# Patient Record
Sex: FEMALE | Race: BLACK OR AFRICAN AMERICAN | URBAN - METROPOLITAN AREA
[De-identification: names, ages, dates, MRNs, and addresses within clinical notes are randomized per-mention and may not be internally consistent; named-entity substitution may affect disease eponyms.]

---

## 2017-03-15 ENCOUNTER — OFFICE VISIT (OUTPATIENT)
Dept: FAMILY MEDICINE | Facility: CLINIC | Age: 32
End: 2017-03-15
Payer: COMMERCIAL

## 2017-03-15 VITALS
HEART RATE: 84 BPM | OXYGEN SATURATION: 100 % | TEMPERATURE: 98.5 F | RESPIRATION RATE: 12 BRPM | BODY MASS INDEX: 34.23 KG/M2 | SYSTOLIC BLOOD PRESSURE: 120 MMHG | DIASTOLIC BLOOD PRESSURE: 56 MMHG | HEIGHT: 63 IN | WEIGHT: 193.2 LBS

## 2017-03-15 DIAGNOSIS — N76.0 BACTERIAL VAGINOSIS: Primary | ICD-10-CM

## 2017-03-15 DIAGNOSIS — N89.8 VAGINAL ODOR: ICD-10-CM

## 2017-03-15 DIAGNOSIS — B96.89 BACTERIAL VAGINOSIS: Primary | ICD-10-CM

## 2017-03-15 DIAGNOSIS — N89.8 VAGINAL DISCHARGE: ICD-10-CM

## 2017-03-15 LAB
MICRO REPORT STATUS: ABNORMAL
SPECIMEN SOURCE: ABNORMAL
WET PREP SPEC: ABNORMAL

## 2017-03-15 PROCEDURE — 87210 SMEAR WET MOUNT SALINE/INK: CPT | Performed by: PHYSICIAN ASSISTANT

## 2017-03-15 PROCEDURE — 99213 OFFICE O/P EST LOW 20 MIN: CPT | Performed by: PHYSICIAN ASSISTANT

## 2017-03-15 RX ORDER — METRONIDAZOLE 500 MG/1
500 TABLET ORAL 2 TIMES DAILY
Qty: 14 TABLET | Refills: 0 | Status: SHIPPED | OUTPATIENT
Start: 2017-03-15 | End: 2017-08-14

## 2017-03-15 NOTE — PATIENT INSTRUCTIONS
Take flagyl twice a day for 7 days   Follow up with us if no improvement over the next 3-5 days  Return urgently if any change in symptoms.

## 2017-03-15 NOTE — MR AVS SNAPSHOT
"              After Visit Summary   3/15/2017    Gisselle Kilpatrick    MRN: [id number]           Patient Information     Date Of Birth          1985        Visit Information        Provider Department      3/15/2017 3:40 PM Sade Crockett PA-C [facility]        Today's Diagnoses     Vaginal discharge    -  1    Vaginal odor        Bacterial vaginosis          Care Instructions    Take flagyl twice a day for 7 days   Follow up with us if no improvement over the next 3-5 days  Return urgently if any change in symptoms.          Follow-ups after your visit        Who to contact     If you have questions or need follow up information about today's clinic visit or your schedule please contact [facility] directly at [phone].  Normal or non-critical lab and imaging results will be communicated to you by MyChart, letter or phone within 4 business days after the clinic has received the results. If you do not hear from us within 7 days, please contact the clinic through [employer] or phone. If you have a critical or abnormal lab result, we will notify you by phone as soon as possible.  Submit refill requests through [employer] or call your pharmacy and they will forward the refill request to us. Please allow 3 business days for your refill to be completed.          Additional Information About Your Visit        MyChart Information     [employer] lets you send messages to your doctor, view your test results, renew your prescriptions, schedule appointments and more. To sign up, go to [email or web address] . Click on \"Log in\" on the left side of the screen, which will take you to the Welcome page. Then click on \"Sign up Now\" on the right side of the page.     You will be asked to enter the access code listed below, as well as some personal information. Please follow the directions to create your username and password.     Your access code is: [email or web address]  Expires: 6/13/2017  4:17 PM     Your " "access code will  in 90 days. If you need help or a new code, please call your [facility] clinic or [phone].        Care EveryWhere ID     This is your Care EveryWhere ID. This could be used by other organizations to access your [facility] medical records  [id number]        Your Vitals Were     Pulse Temperature Respirations Height Pulse Oximetry BMI (Body Mass Index)    84 98.5  F (36.9  C) (Oral) 12 1.607 m (5' 3.25\") 100% 33.95 kg/m2       Blood Pressure from Last 3 Encounters:   03/15/17 120/56    Weight from Last 3 Encounters:   03/15/17 87.6 kg (193 lb 3.2 oz)              We Performed the Following     Wet prep          Today's Medication Changes          These changes are accurate as of: 3/15/17  4:17 PM.  If you have any questions, ask your nurse or doctor.               Start taking these medicines.        Dose/Directions    metroNIDAZOLE 500 MG tablet   Commonly known as:  FLAGYL   Used for:  Bacterial vaginosis   Started by:  Sade Crockett PA-C        Dose:  500 mg   Take 1 tablet (500 mg) by mouth 2 times daily   Quantity:  14 tablet   Refills:  0            Where to get your medicines      These medications were sent to [facility] [address] [place] [address] [address] [facility] AT [facility]  [address] [facility] [address]     Phone:  [phone]     metroNIDAZOLE 500 MG tablet                Primary Care Provider    None Specified       No primary provider on file.        Thank you!     Thank you for choosing [facility]  for your care. Our goal is always to provide you with excellent care. Hearing back from our patients is one way we can continue to improve our services. Please take a few minutes to complete the written survey that you may receive in the mail after your visit with us. Thank you!             Your Updated Medication List - Protect others around you: Learn how to safely use, store and throw away your medicines at " www.disposemymeds.org.          This list is accurate as of: 3/15/17  4:17 PM.  Always use your most recent med list.                   Brand Name Dispense Instructions for use    metroNIDAZOLE 500 MG tablet    FLAGYL    14 tablet    Take 1 tablet (500 mg) by mouth 2 times daily

## 2017-03-15 NOTE — Clinical Note
Pap smear done on this date: 11/2016 (approximately), by this group: [facility], results were Normal.

## 2017-03-15 NOTE — NURSING NOTE
"Chief Complaint   Patient presents with     Vaginal Problem       Initial /56  Pulse 84  Temp 98.5  F (36.9  C) (Oral)  Resp 12  Ht 1.607 m (5' 3.25\")  Wt 87.6 kg (193 lb 3.2 oz)  SpO2 100%  BMI 33.95 kg/m2 Estimated body mass index is 33.95 kg/(m^2) as calculated from the following:    Height as of this encounter: 1.607 m (5' 3.25\").    Weight as of this encounter: 87.6 kg (193 lb 3.2 oz).  Medication Reconciliation: gloria Navarro        "

## 2017-03-15 NOTE — PROGRESS NOTES
SUBJECTIVE:                                                    Gisselle Kilpatrick is a 31 year old female who presents to clinic today for the following health issues:      Vaginal Symptoms     Onset: about a week ago    Description:  Vaginal Discharge: white   Itching (Pruritis): no   Burning sensation:  no   Odor: YES    Accompanying Signs & Symptoms:  Pain with Urination: no   Abdominal Pain: no   Fever: no    History:   Sexually active: no   New Partner: no   Possibility of Pregnancy:  No    Precipitating factors:   Recent Antibiotic Use: no     Alleviating factors:  nothing   Therapies Tried and outcome: nothing    Please abstract the following data from this visit with this patient into the appropriate field in Epic:    Pap smear done on this date: 11/2016 (approximately), by this group: [facility], results were Normal.     Reports that she develops bacterial vaginosis approximately every 6 months.  Reports has not been sexually active since September and tested for sexually transmitted disease at that time and declines testing today.   No abdominal pain.  No flank pain.  No urinary tract infection symptoms.   Creamy white vaginal discharge for approximately one week.     Problem list and histories reviewed & adjusted, as indicated.  Additional history: as documented    There is no problem list on file for this patient.    History reviewed. No pertinent past surgical history.    Social History   Substance Use Topics     Smoking status: Never Smoker     Smokeless tobacco: Not on file     Alcohol use No     Family History   Problem Relation Age of Onset     DIABETES Mother      Hypertension Mother          Current Outpatient Prescriptions   Medication Sig Dispense Refill     None           Reviewed and updated as needed this visit by clinical staff  Tobacco  Allergies  Meds  Med Hx  Surg Hx  Fam Hx  Soc Hx      Reviewed and updated as needed this visit by Provider         ROS:  Constitutional, HEENT,  "cardiovascular, pulmonary, gi and gu systems are negative, except as otherwise noted.    OBJECTIVE:                                                    /56  Pulse 84  Temp 98.5  F (36.9  C) (Oral)  Resp 12  Ht 1.607 m (5' 3.25\")  Wt 87.6 kg (193 lb 3.2 oz)  SpO2 100%  BMI 33.95 kg/m2  Body mass index is 33.95 kg/(m^2).  GENERAL: healthy, alert and no distress  NECK: no adenopathy, no asymmetry, masses, or scars and thyroid normal to palpation  RESP: lungs clear to auscultation - no rales, rhonchi or wheezes  CV: regular rate and rhythm, normal S1 S2, no S3 or S4, no murmur, click or rub, no peripheral edema and peripheral pulses strong  ABDOMEN: soft, nontender, no hepatosplenomegaly, no masses and bowel sounds normal   (female): normal female external genitalia, normal urethral meatus , vaginal mucosa pink, moist, well rugated, vaginal discharge - moderate, white and milky and normal cervix, adnexae, and uterus without masses.  MS: no gross musculoskeletal defects noted, no edema    Diagnostic Test Results:  Results for orders placed or performed in visit on 03/15/17 (from the past 24 hour(s))   Wet prep   Result Value Ref Range    Specimen Description Cervical     Wet Prep (A)      No Trichomonas seen  Clue cells seen  No yeast seen      Micro Report Status FINAL 03/15/2017         ASSESSMENT/PLAN:                                                            1. Bacterial vaginosis  Patient prefers to treat with oral flagyl. Aware no alcohol with medication.    - metroNIDAZOLE (FLAGYL) 500 MG tablet; Take 1 tablet (500 mg) by mouth 2 times daily  Dispense: 14 tablet; Refill: 0    2. Vaginal discharge    - Wet prep    3. Vaginal odor    - Wet prep    Patient Instructions   Take flagyl twice a day for 7 days   Follow up with us if no improvement over the next 3-5 days  Return urgently if any change in symptoms.         Sade Crockett PA-C  [facility]    "

## 2017-08-14 ENCOUNTER — OFFICE VISIT (OUTPATIENT)
Dept: FAMILY MEDICINE | Facility: CLINIC | Age: 32
End: 2017-08-14
Payer: COMMERCIAL

## 2017-08-14 VITALS
WEIGHT: 190.3 LBS | RESPIRATION RATE: 12 BRPM | SYSTOLIC BLOOD PRESSURE: 124 MMHG | DIASTOLIC BLOOD PRESSURE: 72 MMHG | HEIGHT: 63 IN | BODY MASS INDEX: 33.72 KG/M2 | TEMPERATURE: 98.3 F | HEART RATE: 68 BPM | OXYGEN SATURATION: 98 %

## 2017-08-14 DIAGNOSIS — N63.0 LUMP OR MASS IN BREAST: Primary | ICD-10-CM

## 2017-08-14 PROCEDURE — 99213 OFFICE O/P EST LOW 20 MIN: CPT | Performed by: NURSE PRACTITIONER

## 2017-08-14 NOTE — NURSING NOTE
"Chief Complaint   Patient presents with     Mass       Initial /72 (BP Location: Right arm, Patient Position: Chair, Cuff Size: Adult Large)  Pulse 68  Temp 98.3  F (36.8  C) (Oral)  Resp 12  Ht 1.607 m (5' 3.25\")  Wt 86.3 kg (190 lb 4.8 oz)  SpO2 98%  BMI 33.44 kg/m2 Estimated body mass index is 33.44 kg/(m^2) as calculated from the following:    Height as of this encounter: 1.607 m (5' 3.25\").    Weight as of this encounter: 86.3 kg (190 lb 4.8 oz).  Medication Reconciliation: gloria Navarro        "

## 2017-08-14 NOTE — MR AVS SNAPSHOT
"              After Visit Summary   8/14/2017    Gisselle Kilpatrick    MRN: [id number]           Patient Information     Date Of Birth          1985        Visit Information        Provider Department      8/14/2017 1:00 PM Jannie Issa NP [facility]        Today's Diagnoses     Need for prophylactic vaccination with tetanus-diphtheria (TD)    -  1    Lump or mass in breast          Care Instructions    Ultrasound: [phone]          Follow-ups after your visit        Future tests that were ordered for you today     Open Future Orders        Priority Expected Expires Ordered    *MA Screening Digital Bilateral Routine  8/14/2018 8/14/2017    US Breast Bilateral Complete 4 Quadrants Routine  8/14/2018 8/14/2017            Who to contact     If you have questions or need follow up information about today's clinic visit or your schedule please contact [facility] directly at [phone].  Normal or non-critical lab and imaging results will be communicated to you by MyChart, letter or phone within 4 business days after the clinic has received the results. If you do not hear from us within 7 days, please contact the clinic through MyChart or phone. If you have a critical or abnormal lab result, we will notify you by phone as soon as possible.  Submit refill requests through [employer] or call your pharmacy and they will forward the refill request to us. Please allow 3 business days for your refill to be completed.          Additional Information About Your Visit        MyChart Information     [employer] lets you send messages to your doctor, view your test results, renew your prescriptions, schedule appointments and more. To sign up, go to [email or web address] . Click on \"Log in\" on the left side of the screen, which will take you to the Welcome page. Then click on \"Sign up Now\" on the right side of the page.     You will be asked to enter the access code listed below, as well as some " "personal information. Please follow the directions to create your username and password.     Your access code is: [email or web address]  Expires: 2017  1:16 PM     Your access code will  in 90 days. If you need help or a new code, please call your [facility] [place] clinic or [phone].        Care EveryWhere ID     This is your Care EveryWhere ID. This could be used by other organizations to access your [facility] medical records  [id number]        Your Vitals Were     Pulse Temperature Respirations Height Pulse Oximetry BMI (Body Mass Index)    68 98.3  F (36.8  C) (Oral) 12 1.607 m (5' 3.25\") 98% 33.44 kg/m2       Blood Pressure from Last 3 Encounters:   17 124/72   03/15/17 120/56    Weight from Last 3 Encounters:   17 86.3 kg (190 lb 4.8 oz)   03/15/17 87.6 kg (193 lb 3.2 oz)               Primary Care Provider    None Specified       No primary provider on file.        Equal Access to Services     [facility]: Hadii francesca godoy hadasho Soomaali, waaxda luqadaha, qaybta kaalmada adeegyamyels, chadd spring . So [facility] [phone].    ATENCIÓN: Si habla español, tiene a young disposición servicios gratuitos de asistencia lingüística. Llame al [phone].    We comply with applicable federal civil rights laws and [place] laws. We do not discriminate on the basis of race, color, national origin, age, disability sex, sexual orientation or gender identity.            Thank you!     Thank you for choosing [facility]  for your care. Our goal is always to provide you with excellent care. Hearing back from our patients is one way we can continue to improve our services. Please take a few minutes to complete the written survey that you may receive in the mail after your visit with us. Thank you!             Your Updated Medication List - Protect others around you: Learn how to safely use, store and throw away your medicines at www.disposemymeds.org.      Notice  As of 2017 "  1:16 PM    You have not been prescribed any medications.

## 2017-08-14 NOTE — PROGRESS NOTES
"  SUBJECTIVE:                                                    Gisselle Kilpatrick is a 32 year old female who presents to clinic today for the following health issues:      Had a mass removed in LT breast last year at [facility] around 8/30/17, and has another one in the same spot that you noticed a couple days ago. Is due for her mammogram.  She has found the mass that was removed last year. It started to change in size and bother her so she had it removed.         Problem list and histories reviewed & adjusted, as indicated.  Additional history: as documented    There is no problem list on file for this patient.    Past Surgical History:   Procedure Laterality Date     LUMPECTOMY BREAST Left 08/2016       Social History   Substance Use Topics     Smoking status: Never Smoker     Smokeless tobacco: Never Used     Alcohol use No     Family History   Problem Relation Age of Onset     DIABETES Mother      Hypertension Mother      KIDNEY DISEASE Paternal Grandmother      dialysis     Lung Cancer Maternal Uncle          No current outpatient prescriptions on file.     No Known Allergies      Reviewed and updated as needed this visit by clinical staffTobacco  Allergies  Meds  Problems  Med Hx  Surg Hx  Fam Hx  Soc Hx        Reviewed and updated as needed this visit by Provider  Allergies  Meds  Problems  Surg Hx  Fam Hx         ROS:  Constitutional, breast systems are negative, except as otherwise noted.      OBJECTIVE:   /72 (BP Location: Right arm, Patient Position: Chair, Cuff Size: Adult Large)  Pulse 68  Temp 98.3  F (36.8  C) (Oral)  Resp 12  Ht 1.607 m (5' 3.25\")  Wt 86.3 kg (190 lb 4.8 oz)  SpO2 98%  BMI 33.44 kg/m2  Body mass index is 33.44 kg/(m^2).  GENERAL: healthy, alert and no distress  BREAST: normal without masses, tenderness or nipple discharge and no palpable axillary masses or adenopathy  MS: no gross musculoskeletal defects noted, no edema    Diagnostic Test " Results:  none     ASSESSMENT/PLAN:       1. Lump or mass in breast  Lump in same area as one removed last year. Last year it was benign. Will order ultrasound and mammogram with diagnostic testing per radiology.   - *MA Screening Digital Bilateral; Future  - US Breast Bilateral Complete 4 Quadrants; Future    FUTURE APPOINTMENTS:       - Follow-up for annual visit or as needed    CAMRYN Holden, NP-C  [facility]

## 2017-08-22 ENCOUNTER — RADIANT APPOINTMENT (OUTPATIENT)
Dept: MAMMOGRAPHY | Facility: CLINIC | Age: 32
End: 2017-08-22
Payer: COMMERCIAL

## 2017-08-22 ENCOUNTER — RADIANT APPOINTMENT (OUTPATIENT)
Dept: ULTRASOUND IMAGING | Facility: CLINIC | Age: 32
End: 2017-08-22
Payer: COMMERCIAL

## 2017-08-22 DIAGNOSIS — N63.0 LUMP OR MASS IN BREAST: ICD-10-CM

## 2017-08-22 PROCEDURE — 76642 ULTRASOUND BREAST LIMITED: CPT | Mod: LT | Performed by: RADIOLOGY

## 2017-08-22 PROCEDURE — G0204 DX MAMMO INCL CAD BI: HCPCS | Performed by: RADIOLOGY

## 2017-08-28 ENCOUNTER — RADIANT APPOINTMENT (OUTPATIENT)
Dept: ULTRASOUND IMAGING | Facility: CLINIC | Age: 32
End: 2017-08-28
Payer: COMMERCIAL

## 2017-08-28 DIAGNOSIS — N63.0 LUMP OR MASS IN BREAST: ICD-10-CM

## 2017-08-28 PROCEDURE — 88305 TISSUE EXAM BY PATHOLOGIST: CPT | Performed by: NURSE PRACTITIONER

## 2017-08-28 PROCEDURE — 19083 BX BREAST 1ST LESION US IMAG: CPT | Mod: LT | Performed by: RADIOLOGY

## 2017-08-30 ENCOUNTER — TELEPHONE (OUTPATIENT)
Dept: GENERAL RADIOLOGY | Facility: CLINIC | Age: 32
End: 2017-08-30

## 2017-08-30 LAB — COPATH REPORT: NORMAL

## 2017-08-30 NOTE — TELEPHONE ENCOUNTER
Spoke with patient regarding left breast biopsy results, which indicate a benign fibroadenoma. Notified pt that the radiologist recommendation is surgical consultation. Pt verbalized understanding of these results and all questions answered to her satisfaction. Pt is scheduled with Dr. Tatum Huynh on 9/14/17.

## 2017-09-11 ENCOUNTER — TELEPHONE (OUTPATIENT)
Dept: SURGERY | Facility: CLINIC | Age: 32
End: 2017-09-11

## 2017-09-11 NOTE — TELEPHONE ENCOUNTER
PREVISIT INFORMATION                                                    Gisselle Kilpatrick is scheduled for future visit with Dr. Huynh on 9/14/17 at the [facility] specialty clinics.    Reason for visit: Fibroadenoma  Referring provider:Radiology  Have images been done? Yes    Location: [facility]   Date: 8/22/17 8/28/17  Previous pathology reports? yes  Have previous office records been requested? no  Has the patient seen Dr. Huynh in the past? (for old records): no     REVIEW                                                      New patient packet mailed to patient: to be given at check in   Medication reconciliation complete: No    PLAN/FOLLOW-UP NEEDED                                                      Previsit review complete.  Patient will see provider at future scheduled appointment.     Patient Reminders Given:    Informed patient to bring an updated list of allergies, medications, pharmacy details and insurance information. Directed patient to come to the 2nd floor, check-in #4 for their appointment. Informed patient to call back if appointment needs to be cancelled or rescheduled at [phone].    Reminded patient to bring any outside records regarding this appointment or have them faxed to clinic at [phone].

## 2017-09-14 ENCOUNTER — OFFICE VISIT (OUTPATIENT)
Dept: SURGERY | Facility: CLINIC | Age: 32
End: 2017-09-14
Payer: COMMERCIAL

## 2017-09-14 VITALS
HEART RATE: 63 BPM | SYSTOLIC BLOOD PRESSURE: 115 MMHG | DIASTOLIC BLOOD PRESSURE: 72 MMHG | HEIGHT: 63 IN | WEIGHT: 188.1 LBS | BODY MASS INDEX: 33.33 KG/M2

## 2017-09-14 DIAGNOSIS — N63.20 BREAST MASS, LEFT: Primary | ICD-10-CM

## 2017-09-14 PROCEDURE — 99243 OFF/OP CNSLTJ NEW/EST LOW 30: CPT | Performed by: SURGERY

## 2017-09-14 NOTE — PROGRESS NOTES
CHIEF COMPLAINT:  Chief Complaint   Patient presents with     Consult     Fibroadenoma       HISTORY OF PRESENT ILLNESS:  Gisselle Kilpatrick is a 32 year old female who is seen in consultation at the request of  Jannie Issa NP for evaluation of a left breast mass.  Gisselle initially noted a lump in her left breast in .  Ultrasound at that time showed several masses in the left retroareolar space.  She was advised to have follow up ultrasound.  In , she had a right breast excision for a cyst or infectious process.  In 2016, Gisselle presented again with complaints of a left breast mass which was now larger.  Again several masses were seen in the left retroareolar space with the appearance of fibroadenomas, the largest measuring 3.7 cm.  Surgical excision was recommended and was performed on 16.  From the pathology report, it appears that a single, large, benign fibroadenoma was removed.  Almost exactly 1 year later, Gisselle again noticed a moderately large mass in the left retroareolar space that was tender.  She presented for evaluation and was found to have 2 adjacent masses, the larger measuring 2.8 cm in greatest dimension.  A needle biopsy was performed and again showed the mass to be a benign fibroadenoma.  Since the biopsy, the mass has become painful.  Gisselle is interested in having these masses excised as well.  She has noted no other breast changes.    Gisselle reached menarche at age 12.  She is  having had her first pregnancy at age 17.  She did not breastfeed.  Her periods are regular and she uses tubal ligation for contraception.  Gisselle works as a CNA.  She does not smoke or drink alcohol.  FH is negative for breast, ovarian, colon, uterine, prostate or pancreatic CA.  Her maternal uncle, who was a smoker, had lung cancer.    REVIEW OF SYSTEMS:  Constitutional:  Negative for chills, fatigue, fever and weight change.  Eyes:  Negative for blurred vision, eye pain and  "photophobia.  ENT:  Negative for hearing problems, ENT pain, congestion, rhinorrhea, epistaxis, hoarseness and dental problems.  Cardiovascular:  Negative for chest pain, palpitations, tachycardia, orthopnea and edema.  Respiratory:  Negative for cough, dyspnea and hemoptysis.  Gastrointestinal:  Negative for abdominal pain, heartburn, constipation, diarrhea and stool changes.  Musculoskeletal:  Negative for arthralgias, back pain and myalgias.  Integumentary/Breast:  See HPI.    No past medical history on file.    Past Surgical History:   Procedure Laterality Date     LUMPECTOMY BREAST Left 08/2016       Family History   Problem Relation Age of Onset     DIABETES Mother      Hypertension Mother      KIDNEY DISEASE Paternal Grandmother      dialysis     Lung Cancer Maternal Uncle        Social History   Substance Use Topics     Smoking status: Never Smoker     Smokeless tobacco: Never Used     Alcohol use No       There is no problem list on file for this patient.    No Known Allergies  No current outpatient prescriptions on file.     Vitals: /72 (BP Location: Left arm)  Pulse 63  Ht 5' 3.25\" (1.607 m)  Wt 188 lb 1.6 oz (85.3 kg)  BMI 33.06 kg/m2  BMI= Body mass index is 33.06 kg/(m^2).    EXAM:  GENERAL: healthy, alert and no distress   BREAST:  The breasts appear symmetric with no overlying skin changes.  The nipples are normal bilaterally.  There is no dimpling or thickening of the skin.  Well healed periareolar scars are seen bilaterally.  The scar on the left is a little thickened.  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  No mass is appreciated in the right breast.  In the left retroareolar space, 2 masses are easily palpated and are tender.  No other mass is palpated in the left breast.  The breast tissue is generally soft with increased density in the upper outer quadrants.  There is no axillary or supraclavicular lymphadenopathy.  CARDIOVASCULAR:  negative,RRR. No " murmurs, clicks gallops or rub.  Normal S1 and S2.  RESPIRATORY: negative. Good diaphragmatic excursion. Lungs clear bilaterally to auscultation.  NECK: Neck supple. No adenopathy.   SKIN: No suspicious lesions or rashes  LYMPH: Normal cervical lymph nodes    ASSESSMENT:  Gisselle Kilpatrick has masses in the left retroareolar space, one of which has been biopsied and shown to be a benign fibroadenoma.  The masses are growing and are symptomatic.  I suspect that these are the other masses that were seen on ultrasound but were not removed with the larger mass that was removed 1 year ago.  I offered excisional biopsy to remove the residual masses and described the procedure and risks.  We talked about local anesthesia vs MAC.  I reassured her that I am not at all worried about malignancy, but that I think it is very reasonable to remove the masses.    PLAN:  Gisselle would like the left breast masses removed.  She will be scheduled for a left breast excisional biopsy under MAC at the [facility].    Total time with patient visit: 30 minutes including discussions about the plan of care and care coordination with the patient.    Tatum Huynh MD    Please route or send letter to:  Jannie Issa NP

## 2017-09-14 NOTE — MR AVS SNAPSHOT
After Visit Summary   9/14/2017    Gisselle Kilpatrick    MRN: [id number]           Patient Information     Date Of Birth          1985        Visit Information        Provider Department      9/14/2017 10:00 AM Tatum Huynh MD [facility]        Today's Diagnoses     Breast mass, left    -  1      Care Instructions      Your surgery is scheduled for 10/10/17 9:00 am at [facility]          Follow-ups after your visit        Your next 10 appointments already scheduled     Oct 10, 2017  8:45 AM T   [facility] with Tatum Huynh MD   [facility] ([facility])    [facility] ([facility])   [phone]            Oct 10, 2017   Procedure with Tatum Huynh MD   [place] (--)    [address] [place]  [facility] [address] [phone]              Who to contact     If you have questions or need follow up information about today's clinic visit or your schedule please contact [facility] directly at [phone].  Normal or non-critical lab and imaging results will be communicated to you by Parenthoodshart, letter or phone within 4 business days after the clinic has received the results. If you do not hear from us within 7 days, please contact the clinic through Parenthoodshart or phone. If you have a critical or abnormal lab result, we will notify you by phone as soon as possible.  Submit refill requests through [employer] or call your pharmacy and they will forward the refill request to us. Please allow 3 business days for your refill to be completed.          Additional Information About Your Visit        ParenthoodsharScint-X Information     [employer] is an electronic gateway that provides easy, online access to your medical records. With [employer], you can request a clinic appointment, read your test results, renew a prescription or communicate with your care team.    "  To sign up for Grow [employer] visit the website at [email or web address]   You will be asked to enter the access code listed below, as well as some personal information. Please follow the directions to create your username and password.     Your access code is: [email or web address]  Expires: 2017  1:16 PM     Your access code will  in 90 days. If you need help or a new code, please contact your [facility] or call [phone] for assistance.        Care EveryWhere ID     This is your Care EveryWhere ID. This could be used by other organizations to access your [facility] medical records  [id number]        Your Vitals Were     Pulse Height BMI (Body Mass Index)             63 1.607 m (5' 3.25\") 33.06 kg/m2          Blood Pressure from Last 3 Encounters:   17 115/72   17 124/72   03/15/17 120/56    Weight from Last 3 Encounters:   17 85.3 kg (188 lb 1.6 oz)   17 86.3 kg (190 lb 4.8 oz)   03/15/17 87.6 kg (193 lb 3.2 oz)              Today, you had the following     No orders found for display       Primary Care Provider    None Specified       No primary provider on file.        Equal Access to Services     ROE BLACK : Hadii francesca godoy hadasho Soomaali, waaxda luqadaha, qaybta kaalmada adeegyada, chadd spring . So [facility] [phone].    ATENCIÓN: Si habla español, tiene a young disposición servicios gratPersonal Factoryos de asistencia lingüística. Llame al [phone].    We comply with applicable federal civil rights laws and [place] laws. We do not discriminate on the basis of race, color, national origin, age, disability sex, sexual orientation or gender identity.            Thank you!     Thank you for choosing [facility]  for your care. Our goal is always to provide you with excellent care. Hearing back from our patients is one way we can continue to improve our services. Please take a few minutes to complete the written " survey that you may receive in the mail after your visit with us. Thank you!             Your Updated Medication List - Protect others around you: Learn how to safely use, store and throw away your medicines at www.disposemymeds.org.      Notice  As of 9/14/2017  6:26 PM    You have not been prescribed any medications.

## 2017-09-14 NOTE — LETTER
2017    RE:  Gisselle Kilpatrick-:  [birth year]    HISTORY OF PRESENT ILLNESS:  Gisselle Kilpatrick is a 32 year old female who is seen in consultation at the request of  Jannie Issa NP for evaluation of a left breast mass.  Gisselle initially noted a lump in her left breast in .  Ultrasound at that time showed several masses in the left retroareolar space.  She was advised to have follow up ultrasound.  In , she had a right breast excision for a cyst or infectious process.  In 2016, Gisselle presented again with complaints of a left breast mass which was now larger.  Again several masses were seen in the left retroareolar space with the appearance of fibroadenomas, the largest measuring 3.7 cm.  Surgical excision was recommended and was performed on 16.  From the pathology report, it appears that a single, large, benign fibroadenoma was removed.  Almost exactly 1 year later, Gisselle again noticed a moderately large mass in the left retroareolar space that was tender.  She presented for evaluation and was found to have 2 adjacent masses, the larger measuring 2.8 cm in greatest dimension.  A needle biopsy was performed and again showed the mass to be a benign fibroadenoma.  Since the biopsy, the mass has become painful.  Gisselle is interested in having these masses excised as well.  She has noted no other breast changes.     Gisselle reached menarche at age 12.  She is  having had her first pregnancy at age 17.  She did not breastfeed.  Her periods are regular and she uses tubal ligation for contraception.  Gisselle works as a CNA.  She does not smoke or drink alcohol.  FH is negative for breast, ovarian, colon, uterine, prostate or pancreatic CA.  Her maternal uncle, who was a smoker, had lung cancer.     REVIEW OF SYSTEMS:  Constitutional:  Negative for chills, fatigue, fever and weight change.  Eyes:  Negative for blurred vision, eye pain and photophobia.  ENT:  Negative for hearing  "problems, ENT pain, congestion, rhinorrhea, epistaxis, hoarseness and dental problems.  Cardiovascular:  Negative for chest pain, palpitations, tachycardia, orthopnea and edema.  Respiratory:  Negative for cough, dyspnea and hemoptysis.  Gastrointestinal:  Negative for abdominal pain, heartburn, constipation, diarrhea and stool changes.  Musculoskeletal:  Negative for arthralgias, back pain and myalgias.  Integumentary/Breast:  See HPI.     There is no problem list on file for this patient.     Vitals: /72 (BP Location: Left arm)  Pulse 63  Ht 5' 3.25\" (1.607 m)  Wt 188 lb 1.6 oz (85.3 kg)  BMI 33.06 kg/m2  BMI= Body mass index is 33.06 kg/(m^2).     EXAM:  GENERAL: healthy, alert and no distress   BREAST:  The breasts appear symmetric with no overlying skin changes.  The nipples are normal bilaterally.  There is no dimpling or thickening of the skin.  Well healed periareolar scars are seen bilaterally.  The scar on the left is a little thickened.  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  No mass is appreciated in the right breast.  In the left retroareolar space, 2 masses are easily palpated and are tender.  No other mass is palpated in the left breast.  The breast tissue is generally soft with increased density in the upper outer quadrants.  There is no axillary or supraclavicular lymphadenopathy.  CARDIOVASCULAR:  negative,RRR. No murmurs, clicks gallops or rub.  Normal S1 and S2.  RESPIRATORY: negative. Good diaphragmatic excursion. Lungs clear bilaterally to auscultation.  NECK: Neck supple. No adenopathy.   SKIN: No suspicious lesions or rashes  LYMPH: Normal cervical lymph nodes     ASSESSMENT:  Gisselle Kilpatrick has masses in the left retroareolar space, one of which has been biopsied and shown to be a benign fibroadenoma.  The masses are growing and are symptomatic.  I suspect that these are the other masses that were seen on ultrasound but were not removed with the " larger mass that was removed 1 year ago.  I offered excisional biopsy to remove the residual masses and described the procedure and risks.  We talked about local anesthesia vs MAC.  I reassured her that I am not at all worried about malignancy, but that I think it is very reasonable to remove the masses.     PLAN:  Gisselle would like the left breast masses removed.  She will be scheduled for a left breast excisional biopsy under MAC at the [facility].     Total time with patient visit: 30 minutes including discussions about the plan of care and care coordination with the patient.     Tatum Huynh MD

## 2017-09-14 NOTE — NURSING NOTE
"Gisselle Kilpatrick's goals for this visit include: new fibroadenoma  She requests these members of her care team be copied on today's visit information: no    PCP: No primary care provider on file.    Referring Provider:  Jannie Issa NP  [address] [place] [address]    Chief Complaint   Patient presents with     Consult     Fibroadenoma       Initial There were no vitals taken for this visit. Estimated body mass index is 33.44 kg/(m^2) as calculated from the following:    Height as of 8/14/17: 1.607 m (5' 3.25\").    Weight as of 8/14/17: 86.3 kg (190 lb 4.8 oz).  Medication Reconciliation: complete    Do you need any medication refills at today's visit? No    Trudy De Leon LPN      "

## 2017-10-09 ENCOUNTER — ANESTHESIA EVENT (OUTPATIENT)
Dept: SURGERY | Facility: AMBULATORY SURGERY CENTER | Age: 32
End: 2017-10-09

## 2017-10-10 ENCOUNTER — OFFICE VISIT (OUTPATIENT)
Dept: SURGERY | Facility: PHYSICIAN GROUP | Age: 32
End: 2017-10-10
Payer: COMMERCIAL

## 2017-10-10 ENCOUNTER — ANESTHESIA (OUTPATIENT)
Dept: SURGERY | Facility: AMBULATORY SURGERY CENTER | Age: 32
End: 2017-10-10

## 2017-10-10 ENCOUNTER — SURGERY (OUTPATIENT)
Age: 32
End: 2017-10-10

## 2017-10-10 ENCOUNTER — HOSPITAL ENCOUNTER (OUTPATIENT)
Facility: AMBULATORY SURGERY CENTER | Age: 32
Discharge: HOME | End: 2017-10-10
Attending: SURGERY | Admitting: SURGERY
Payer: COMMERCIAL

## 2017-10-10 VITALS
RESPIRATION RATE: 16 BRPM | SYSTOLIC BLOOD PRESSURE: 123 MMHG | DIASTOLIC BLOOD PRESSURE: 75 MMHG | OXYGEN SATURATION: 100 % | TEMPERATURE: 97.8 F

## 2017-10-10 DIAGNOSIS — N63.20 LEFT BREAST MASS: Primary | ICD-10-CM

## 2017-10-10 LAB — BETA HCG QUAL IFA URINE: NEGATIVE

## 2017-10-10 PROCEDURE — 19120 REMOVAL OF BREAST LESION: CPT | Mod: LT

## 2017-10-10 PROCEDURE — G8916 PT W IV AB GIVEN ON TIME: HCPCS

## 2017-10-10 PROCEDURE — 84703 CHORIONIC GONADOTROPIN ASSAY: CPT | Performed by: ANESTHESIOLOGY

## 2017-10-10 PROCEDURE — 19120 REMOVAL OF BREAST LESION: CPT | Mod: LT | Performed by: SURGERY

## 2017-10-10 PROCEDURE — G8907 PT DOC NO EVENTS ON DISCHARG: HCPCS

## 2017-10-10 PROCEDURE — 88307 TISSUE EXAM BY PATHOLOGIST: CPT | Performed by: SURGERY

## 2017-10-10 RX ORDER — CELECOXIB 200 MG/1
200 CAPSULE ORAL ONCE
Status: COMPLETED | OUTPATIENT
Start: 2017-10-10 | End: 2017-10-10

## 2017-10-10 RX ORDER — HYDROCODONE BITARTRATE AND ACETAMINOPHEN 5; 325 MG/1; MG/1
1-2 TABLET ORAL
Status: CANCELLED | OUTPATIENT
Start: 2017-10-10

## 2017-10-10 RX ORDER — DEXAMETHASONE SODIUM PHOSPHATE 4 MG/ML
4 INJECTION, SOLUTION INTRA-ARTICULAR; INTRALESIONAL; INTRAMUSCULAR; INTRAVENOUS; SOFT TISSUE EVERY 10 MIN PRN
Status: DISCONTINUED | OUTPATIENT
Start: 2017-10-10 | End: 2017-10-11 | Stop reason: HOSPADM

## 2017-10-10 RX ORDER — FENTANYL CITRATE 50 UG/ML
INJECTION, SOLUTION INTRAMUSCULAR; INTRAVENOUS PRN
Status: DISCONTINUED | OUTPATIENT
Start: 2017-10-10 | End: 2017-10-10

## 2017-10-10 RX ORDER — SODIUM CHLORIDE, SODIUM LACTATE, POTASSIUM CHLORIDE, CALCIUM CHLORIDE 600; 310; 30; 20 MG/100ML; MG/100ML; MG/100ML; MG/100ML
INJECTION, SOLUTION INTRAVENOUS CONTINUOUS
Status: DISCONTINUED | OUTPATIENT
Start: 2017-10-10 | End: 2017-10-11 | Stop reason: HOSPADM

## 2017-10-10 RX ORDER — NALOXONE HYDROCHLORIDE 0.4 MG/ML
.1-.4 INJECTION, SOLUTION INTRAMUSCULAR; INTRAVENOUS; SUBCUTANEOUS
Status: DISCONTINUED | OUTPATIENT
Start: 2017-10-10 | End: 2017-10-11 | Stop reason: HOSPADM

## 2017-10-10 RX ORDER — HYDROCODONE BITARTRATE AND ACETAMINOPHEN 5; 325 MG/1; MG/1
1-2 TABLET ORAL EVERY 6 HOURS PRN
Qty: 15 TABLET | Refills: 0 | Status: SHIPPED | OUTPATIENT
Start: 2017-10-10 | End: 2017-11-21

## 2017-10-10 RX ORDER — HYDROMORPHONE HYDROCHLORIDE 1 MG/ML
.3-.5 INJECTION, SOLUTION INTRAMUSCULAR; INTRAVENOUS; SUBCUTANEOUS EVERY 10 MIN PRN
Status: DISCONTINUED | OUTPATIENT
Start: 2017-10-10 | End: 2017-10-11 | Stop reason: HOSPADM

## 2017-10-10 RX ORDER — CEFAZOLIN SODIUM 1 G/3ML
1 INJECTION, POWDER, FOR SOLUTION INTRAMUSCULAR; INTRAVENOUS SEE ADMIN INSTRUCTIONS
Status: DISCONTINUED | OUTPATIENT
Start: 2017-10-10 | End: 2017-10-11 | Stop reason: HOSPADM

## 2017-10-10 RX ORDER — ACETAMINOPHEN 325 MG/1
650 TABLET ORAL EVERY 4 HOURS PRN
Qty: 100 TABLET | Refills: 0 | COMMUNITY
Start: 2017-10-10 | End: 2017-11-21

## 2017-10-10 RX ORDER — FENTANYL CITRATE 50 UG/ML
25-50 INJECTION, SOLUTION INTRAMUSCULAR; INTRAVENOUS
Status: DISCONTINUED | OUTPATIENT
Start: 2017-10-10 | End: 2017-10-11 | Stop reason: HOSPADM

## 2017-10-10 RX ORDER — ACETAMINOPHEN 325 MG/1
975 TABLET ORAL ONCE
Status: COMPLETED | OUTPATIENT
Start: 2017-10-10 | End: 2017-10-10

## 2017-10-10 RX ORDER — IBUPROFEN 600 MG/1
600 TABLET, FILM COATED ORAL EVERY 6 HOURS PRN
Qty: 30 TABLET | Refills: 0 | COMMUNITY
Start: 2017-10-10 | End: 2017-11-21

## 2017-10-10 RX ORDER — MEPERIDINE HYDROCHLORIDE 25 MG/ML
12.5 INJECTION INTRAMUSCULAR; INTRAVENOUS; SUBCUTANEOUS
Status: DISCONTINUED | OUTPATIENT
Start: 2017-10-10 | End: 2017-10-11 | Stop reason: HOSPADM

## 2017-10-10 RX ORDER — IBUPROFEN 600 MG/1
600 TABLET, FILM COATED ORAL
Status: CANCELLED | OUTPATIENT
Start: 2017-10-10

## 2017-10-10 RX ORDER — LIDOCAINE 40 MG/G
CREAM TOPICAL
Status: DISCONTINUED | OUTPATIENT
Start: 2017-10-10 | End: 2017-10-11 | Stop reason: HOSPADM

## 2017-10-10 RX ORDER — ALBUTEROL SULFATE 0.83 MG/ML
2.5 SOLUTION RESPIRATORY (INHALATION) EVERY 4 HOURS PRN
Status: DISCONTINUED | OUTPATIENT
Start: 2017-10-10 | End: 2017-10-11 | Stop reason: HOSPADM

## 2017-10-10 RX ORDER — ONDANSETRON 4 MG/1
4 TABLET, ORALLY DISINTEGRATING ORAL EVERY 30 MIN PRN
Status: DISCONTINUED | OUTPATIENT
Start: 2017-10-10 | End: 2017-10-11 | Stop reason: HOSPADM

## 2017-10-10 RX ORDER — GABAPENTIN 300 MG/1
300 CAPSULE ORAL ONCE
Status: COMPLETED | OUTPATIENT
Start: 2017-10-10 | End: 2017-10-10

## 2017-10-10 RX ORDER — CEFAZOLIN SODIUM 2 G/100ML
2 INJECTION, SOLUTION INTRAVENOUS
Status: COMPLETED | OUTPATIENT
Start: 2017-10-10 | End: 2017-10-10

## 2017-10-10 RX ORDER — OXYCODONE HYDROCHLORIDE 5 MG/1
5 TABLET ORAL EVERY 4 HOURS PRN
Status: DISCONTINUED | OUTPATIENT
Start: 2017-10-10 | End: 2017-10-11 | Stop reason: HOSPADM

## 2017-10-10 RX ORDER — ONDANSETRON 2 MG/ML
4 INJECTION INTRAMUSCULAR; INTRAVENOUS EVERY 30 MIN PRN
Status: DISCONTINUED | OUTPATIENT
Start: 2017-10-10 | End: 2017-10-11 | Stop reason: HOSPADM

## 2017-10-10 RX ORDER — KETOROLAC TROMETHAMINE 30 MG/ML
30 INJECTION, SOLUTION INTRAMUSCULAR; INTRAVENOUS EVERY 6 HOURS PRN
Status: DISCONTINUED | OUTPATIENT
Start: 2017-10-10 | End: 2017-10-11 | Stop reason: HOSPADM

## 2017-10-10 RX ORDER — PROPOFOL 10 MG/ML
INJECTION, EMULSION INTRAVENOUS CONTINUOUS PRN
Status: DISCONTINUED | OUTPATIENT
Start: 2017-10-10 | End: 2017-10-10

## 2017-10-10 RX ADMIN — PROPOFOL 125 MCG/KG/MIN: 10 INJECTION, EMULSION INTRAVENOUS at 08:21

## 2017-10-10 RX ADMIN — GABAPENTIN 300 MG: 300 CAPSULE ORAL at 07:57

## 2017-10-10 RX ADMIN — Medication 10 ML: at 08:35

## 2017-10-10 RX ADMIN — ACETAMINOPHEN 975 MG: 325 TABLET ORAL at 07:56

## 2017-10-10 RX ADMIN — FENTANYL CITRATE 25 MCG: 50 INJECTION, SOLUTION INTRAMUSCULAR; INTRAVENOUS at 08:33

## 2017-10-10 RX ADMIN — FENTANYL CITRATE 25 MCG: 50 INJECTION, SOLUTION INTRAMUSCULAR; INTRAVENOUS at 08:24

## 2017-10-10 RX ADMIN — OXYCODONE HYDROCHLORIDE 5 MG: 5 TABLET ORAL at 09:17

## 2017-10-10 RX ADMIN — CELECOXIB 200 MG: 200 CAPSULE ORAL at 07:57

## 2017-10-10 RX ADMIN — CEFAZOLIN SODIUM 2 G: 2 INJECTION, SOLUTION INTRAVENOUS at 08:24

## 2017-10-10 RX ADMIN — FENTANYL CITRATE 50 MCG: 50 INJECTION, SOLUTION INTRAMUSCULAR; INTRAVENOUS at 08:22

## 2017-10-10 RX ADMIN — PROPOFOL: 10 INJECTION, EMULSION INTRAVENOUS at 08:48

## 2017-10-10 RX ADMIN — SODIUM CHLORIDE, SODIUM LACTATE, POTASSIUM CHLORIDE, CALCIUM CHLORIDE: 600; 310; 30; 20 INJECTION, SOLUTION INTRAVENOUS at 07:57

## 2017-10-10 NOTE — ANESTHESIA PREPROCEDURE EVALUATION
Anesthesia Evaluation     .             ROS/MED HX    ENT/Pulmonary:  - neg pulmonary ROS     Neurologic:  - neg neurologic ROS     Cardiovascular:  - neg cardiovascular ROS       METS/Exercise Tolerance:     Hematologic:  - neg hematologic  ROS       Musculoskeletal:  - neg musculoskeletal ROS       GI/Hepatic:  - neg GI/hepatic ROS       Renal/Genitourinary:  - ROS Renal section negative       Endo:  - neg endo ROS       Psychiatric:  - neg psychiatric ROS       Infectious Disease:  - neg infectious disease ROS       Malignancy:      - no malignancy   Other:    - neg other ROS                 Physical Exam  Normal systems: cardiovascular, pulmonary and dental    Airway   Mallampati: II  TM distance: >3 FB  Neck ROM: full    Dental     Cardiovascular       Pulmonary                     Anesthesia Plan      History & Physical Review  History and physical reviewed and following examination; no interval change.    ASA Status:  1 .    NPO Status:  > 8 hours    Plan for MAC with Intravenous induction. Maintenance will be TIVA.  Reason for MAC:  Deep or markedly invasive procedure (G8)  PONV prophylaxis:  Ondansetron (or other 5HT-3)       Postoperative Care  Postoperative pain management:  IV analgesics and Oral pain medications.      Consents  Anesthetic plan, risks, benefits and alternatives discussed with:  Patient..                          .

## 2017-10-10 NOTE — IP AVS SNAPSHOT
[facility]    [address] [place] [address]    Phone:  [phone]                                       After Visit Summary   10/10/2017    Gisselle Kilpatrick    MRN: [id number]           After Visit Summary Signature Page     I have received my discharge instructions, and my questions have been answered. I have discussed any challenges I see with this plan with the nurse or doctor.    ..........................................................................................................................................  Patient/Patient Representative Signature      ..........................................................................................................................................  Patient Representative Print Name and Relationship to Patient    ..................................................               ................................................  Date                                            Time    ..........................................................................................................................................  Reviewed by Signature/Title    ...................................................              ..............................................  Date                                                            Time

## 2017-10-10 NOTE — ANESTHESIA CARE TRANSFER NOTE
Patient: Gisselle Kilpatrick    Procedure(s):  Left breast excisional biopsy - Wound Class: I-Clean    Diagnosis: left retroareolar masses  Diagnosis Additional Information: No value filed.    Anesthesia Type:   MAC     Note:  Airway :Room Air  Patient transferred to:Phase II        Vitals: (Last set prior to Anesthesia Care Transfer)    CRNA VITALS  10/10/2017 0835 - 10/10/2017 0910      10/10/2017             Pulse: 79    SpO2: 97 %                Electronically Signed By: CAMRYN Lee CRNA  October 10, 2017  9:10 AM

## 2017-10-10 NOTE — BRIEF OP NOTE
[facility] [place]  General Surgery Brief Operative Note    Pre-operative diagnosis: left retroareolar masses   Post-operative diagnosis * No post-op diagnosis entered *   Procedure: Procedure(s):  BIOPSY BREAST   Surgeon: Tatum Huynh MD, MD   Assistants(s): None   Anesthesia: Monitor Anesthesia Care    Estimated blood loss: * No blood loss amount entered *    Total IV fluids: (See anesthesia record)   Blood transfusion: No transfusion was given during surgery   Total urine output: None   Drains or packing: None   Specimens: Multiple left breast masses.   Implants: None   Findings: Multiple masses of varying sizes with appearance of fibroadenomas.   Complications: None   Condition on discharge: Stable   Comments: See dictated operative report for full details.    Tatum Huynh MD  9:17 AM

## 2017-10-10 NOTE — ANESTHESIA POSTPROCEDURE EVALUATION
Patient: Gisselle Kilpatrick    Procedure(s):  Left breast excisional biopsy - Wound Class: I-Clean    Diagnosis:left retroareolar masses  Diagnosis Additional Information: No value filed.    Anesthesia Type:  MAC    Note:  Anesthesia Post Evaluation    Patient location during evaluation: PACU  Patient participation: Able to fully participate in evaluation  Level of consciousness: awake  Pain management: adequate  Airway patency: patent  Cardiovascular status: acceptable  Respiratory status: acceptable  Hydration status: balanced  PONV: none     Anesthetic complications: None          Last vitals:  Vitals:    10/10/17 0930 10/10/17 0945 10/10/17 1000   BP: 125/86 124/84 123/75   Resp: 16 16 16   Temp:      SpO2: 97% 100% 100%         Electronically Signed By: Luis Tinajero MD  October 10, 2017  2:54 PM

## 2017-10-10 NOTE — DISCHARGE INSTRUCTIONS
[facility]  Same-Day Surgery   Adult Discharge Orders & Instructions   For 24 hours after surgery  1. Get plenty of rest.  A responsible adult must stay with you for at least 24 hours after you leave the hospital.   2. Do not drive or use heavy equipment.  If you have weakness or tingling, don't drive or use heavy equipment until this feeling goes away.  3. Do not drink alcohol.  4. Avoid strenuous or risky activities.  Ask for help when climbing stairs.   5. You may feel lightheaded.  IF so, sit for a few minutes before standing.  Have someone help you get up.   6. If you have nausea (feel sick to your stomach): Drink only clear liquids such as apple juice, ginger ale, broth or 7-Up.  Rest may also help.  Be sure to drink enough fluids.  Move to a regular diet as you feel able.  7. You may have a slight fever. Call the doctor if your fever is over 100 F (37.7 C) (taken under the tongue) or lasts longer than 24 hours.  8. You may have a dry mouth, a sore throat, muscle aches or trouble sleeping.  These should go away after 24 hours.  9. Do not make important or legal decisions.   Call your doctor for any of the followin.  Signs of infection (fever, growing tenderness at the surgery site, a large amount of drainage or bleeding, severe pain, foul-smelling drainage, redness, swelling).    2. It has been over 8 to 10 hours since surgery and you are still not able to urinate (pass water).    3.  Headache for over 24 hours.    4.  Numbness, tingling or weakness the day after surgery (if you had spinal anesthesia).  To contact a Dr. Huynh call:  [phone]

## 2017-10-10 NOTE — IP AVS SNAPSHOT
MRN:[id number]                      After Visit Summary   10/10/2017    Gisselle Kilpatrick    MRN: [id number]           Thank you!     Thank you for choosing [facility] for your care. Our goal is always to provide you with excellent care. Hearing back from our patients is one way we can continue to improve our services. Please take a few minutes to complete the written survey that you may receive in the mail after you visit with us. Thank you!        Patient Information     Date Of Birth          1985        About your hospital stay     You were admitted on:  October 10, 2017 You last received care in the:  [facility]    You were discharged on:  October 10, 2017       Who to Call     For medical emergencies, please call [phone].  For non-urgent questions about your medical care, please call your primary care provider or clinic, None  For questions related to your surgery, please call your surgery clinic        Attending Provider     Provider Specialty    Tatum Huynh MD Surgery       Primary Care Provider    None Specified      After Care Instructions     Diet Instructions       Resume pre-procedure diet            Discharge Instructions       Patient to follow up with appointment in 2 weeks.  Please call [phone] for appointment.            Dressing       Keep dressing clean and dry.  You may remove gauze and tape dressing on 10/12/17.  Leave steristrips in place 7-10 days, then remove.            Ice to affected area       Ice to operative site frequently in the first 2-3 days, then heat might feel good.            No lifting        No lifting over 10 lbs and no strenuous physical activity for 1 week.            Shower       No shower for 48 hours post procedure. May shower Postoperative Day (POD)  2 after removing gauze and tape dressings.                  Further instructions from your care team       [facility]  Same-Day Surgery   Adult  Discharge Orders & Instructions   For 24 hours after surgery  1. Get plenty of rest.  A responsible adult must stay with you for at least 24 hours after you leave the hospital.   2. Do not drive or use heavy equipment.  If you have weakness or tingling, don't drive or use heavy equipment until this feeling goes away.  3. Do not drink alcohol.  4. Avoid strenuous or risky activities.  Ask for help when climbing stairs.   5. You may feel lightheaded.  IF so, sit for a few minutes before standing.  Have someone help you get up.   6. If you have nausea (feel sick to your stomach): Drink only clear liquids such as apple juice, ginger ale, broth or 7-Up.  Rest may also help.  Be sure to drink enough fluids.  Move to a regular diet as you feel able.  7. You may have a slight fever. Call the doctor if your fever is over 100 F (37.7 C) (taken under the tongue) or lasts longer than 24 hours.  8. You may have a dry mouth, a sore throat, muscle aches or trouble sleeping.  These should go away after 24 hours.  9. Do not make important or legal decisions.   Call your doctor for any of the followin.  Signs of infection (fever, growing tenderness at the surgery site, a large amount of drainage or bleeding, severe pain, foul-smelling drainage, redness, swelling).    2. It has been over 8 to 10 hours since surgery and you are still not able to urinate (pass water).    3.  Headache for over 24 hours.    4.  Numbness, tingling or weakness the day after surgery (if you had spinal anesthesia).  To contact a Dr. Huynh call:  [phone]      Pending Results     No orders found from 10/8/2017 to 10/11/2017.            Admission Information     Date & Time Provider Department Dept. Phone    10/10/2017 Tatum Huynh MD [facility] [phone]      Your Vitals Were     Blood Pressure Temperature Respirations Pulse Oximetry          125/86 97.8  F (36.6  C) (Temporal) 16 97%        MyChart Information      "[facility] lets you send messages to your doctor, view your test results, renew your prescriptions, schedule appointments and more. To sign up, go to [email or web address] . Click on \"Log in\" on the left side of the screen, which will take you to the Welcome page. Then click on \"Sign up Now\" on the right side of the page.     You will be asked to enter the access code listed below, as well as some personal information. Please follow the directions to create your username and password.     Your access code is: [email or web address]  Expires: 2017  1:16 PM     Your access code will  in 90 days. If you need help or a new code, please call your [facility] clinic or [phone].        Care EveryWhere ID     This is your Care EveryWhere ID. This could be used by other organizations to access your [facility] medical records  [id number]        Equal Access to Services     ROE BLACK AH: George Paris, maria del carmen ramirez, sherly kaalnile delacruz, chadd spring . So [facility] [phone].    ATENCIÓN: Si habla español, tiene a young disposición servicios gratuitos de asistencia lingüística. Llame al [phone].    We comply with applicable federal civil rights laws and [place] laws. We do not discriminate on the basis of race, color, national origin, age, disability, sex, sexual orientation, or gender identity.               Review of your medicines      START taking        Dose / Directions    acetaminophen 325 MG tablet   Commonly known as:  TYLENOL   Used for:  Left breast mass        Dose:  650 mg   Take 2 tablets (650 mg) by mouth every 4 hours as needed for other (mild pain)   Quantity:  100 tablet   Refills:  0       HYDROcodone-acetaminophen 5-325 MG per tablet   Commonly known as:  NORCO   Used for:  Left breast mass        Dose:  1-2 tablet   Take 1-2 tablets by mouth every 6 hours as needed for other (Moderate to Severe Pain)   Quantity:  15 tablet   Refills:  0       " ibuprofen 600 MG tablet   Commonly known as:  ADVIL/MOTRIN   Used for:  Left breast mass        Dose:  600 mg   Take 1 tablet (600 mg) by mouth every 6 hours as needed for pain (mild)   Quantity:  30 tablet   Refills:  0            Where to get your medicines      Some of these will need a paper prescription and others can be bought over the counter. Ask your nurse if you have questions.     Bring a paper prescription for each of these medications     HYDROcodone-acetaminophen 5-325 MG per tablet       You don't need a prescription for these medications     acetaminophen 325 MG tablet    ibuprofen 600 MG tablet                Protect others around you: Learn how to safely use, store and throw away your medicines at www.disposemymeds.org.             Medication List: This is a list of all your medications and when to take them. Check marks below indicate your daily home schedule. Keep this list as a reference.      Medications           Morning Afternoon Evening Bedtime As Needed    acetaminophen 325 MG tablet   Commonly known as:  TYLENOL   Take 2 tablets (650 mg) by mouth every 4 hours as needed for other (mild pain)   Last time this was given:  975 mg on 10/10/2017  7:56 AM                                HYDROcodone-acetaminophen 5-325 MG per tablet   Commonly known as:  NORCO   Take 1-2 tablets by mouth every 6 hours as needed for other (Moderate to Severe Pain)                                ibuprofen 600 MG tablet   Commonly known as:  ADVIL/MOTRIN   Take 1 tablet (600 mg) by mouth every 6 hours as needed for pain (mild)

## 2017-10-11 NOTE — OP NOTE
DATE OF PROCEDURE:  10/10/2017      PREOPERATIVE DIAGNOSIS:  Left breast masses.      POSTOPERATIVE DIAGNOSIS:  Left breast masses.  Path pending.      PROCEDURE:  Left breast excisional biopsy.      SURGEON:  Tatum Huynh MD      ANESTHESIA:  MAC.      ESTIMATED BLOOD LOSS:  5 mL.      COMPLICATIONS:  None.      SPONGE AND NEEDLE COUNTS:  Correct x2.      FINDINGS:  The largest mass was round and had the appearance of a benign fibroadenoma and was fairly superficial.  Deeper in the central breast tissue were multiple small fibroadenomas, some of which were very tiny, the size of a pin head.  As many of these were removed as possible.      INDICATION:  Gisselle Kilpatrick is a 32-year-old woman who first noticed a lump in her left breast in 2010.  She had ultrasounds and followup over the years and a little over a year ago she had a very large mass in the left retroareolar space that was excised.  It was a benign fibroadenoma.  Almost exactly a year later she again felt a large mass in the left retroareolar space and again had ultrasound and had a needle biopsy of the largest mass which measured 2.8 cm.  The biopsy showed it to be benign fibroadenoma.  Patient was having pain in the breast after the biopsy and was very uncomfortable with the large lump in her left breast.  I met with her and we discussed the findings and options for followup versus excision.  I described the procedure and risks and patient did wish to have the mass excised.  I told her that since we saw other masses on the ultrasound as well that I would try to remove as many of the masses as possible.  The planned procedure and risks were discussed with the patient and she gave consent to proceed.      DETAILS OF OPERATION:  The patient was taken to the operating room and placed in supine position under light sedation.  The left breast was prepped with ChloraPrep and draped in the usual sterile fashion.  The planned incision was marked and the  timeout procedure was performed.  The area was then anesthetized with local anesthetic.  The old scar which was somewhat raised was excised and was extended along the edge of the left areola.  The incision was made with a #15 scalpel blade and deepened with electrocautery.  The mass was easily palpated deep to the areola at about 3 o'clock and this mass was freed and easily removed.  Further palpation revealed several other smaller masses deep to the largest mass and this tissue was excised with the masses.  Some of it looked like it was scar and other tissue looked like there were multiple small fibroadenomas.  Once all of this had been removed, it was all placed together in formalin to be sent to pathology for permanent section.  The wound was inspected for bleeding, and bleeding was controlled with electrocautery.  The breast tissue was then approximated using interrupted 3-0 Vicryl sutures and the skin was closed with 4-0 Vicryl subcuticular stitch.  Steri-Strips and dry sterile dressings were applied.  The patient tolerated the procedure very well and was transferred to the Phase II recovery area in good condition.         SUKHDEV RODRIGUEZ MD             D: 10/10/2017 15:17   T: 10/10/2017 22:25   MT: ROSI#[id number]      Name:     HENRRY RILEY   MRN:      [id number]        Account:        [id number]   :      1985           Procedure Date: 10/10/2017      Document: [id number]       cc: Jannie Issa NP

## 2017-10-12 LAB — COPATH REPORT: NORMAL

## 2017-10-17 ENCOUNTER — TELEPHONE (OUTPATIENT)
Dept: SURGERY | Facility: CLINIC | Age: 32
End: 2017-10-17

## 2017-10-18 NOTE — TELEPHONE ENCOUNTER
I called Gisselle last week to give her the pathology results from her left breast mass excision.  The largest fibroadenoma was around 3 cm.  Multiple other smaller fibroadenomas were also in the specimen.  There was no atypia or malignancy seen.  I was unable to reach Gisselle and asked her to call back.  We have not yet heard back from her.  She will follow up as needed.    Tatum Huynh MD

## 2017-11-02 ENCOUNTER — OFFICE VISIT (OUTPATIENT)
Dept: SURGERY | Facility: CLINIC | Age: 32
End: 2017-11-02
Payer: COMMERCIAL

## 2017-11-02 VITALS — SYSTOLIC BLOOD PRESSURE: 132 MMHG | HEART RATE: 64 BPM | DIASTOLIC BLOOD PRESSURE: 81 MMHG

## 2017-11-02 DIAGNOSIS — D24.2 FIBROADENOMA OF LEFT BREAST IN FEMALE: Primary | ICD-10-CM

## 2017-11-02 PROCEDURE — 99024 POSTOP FOLLOW-UP VISIT: CPT | Performed by: SURGERY

## 2017-11-02 NOTE — MR AVS SNAPSHOT
After Visit Summary   2017    Gisselle Kilpatrick    MRN: [id number]           Patient Information     Date Of Birth          1985        Visit Information        Provider Department      2017 3:00 PM Tatum Huynh MD [facility]        Today's Diagnoses     Fibroadenoma of left breast in female    -  1       Follow-ups after your visit        Follow-up notes from your care team     Return in about 6 months (around 2018), or if symptoms worsen or fail to improve, for breast cancer follow up.      Who to contact     If you have questions or need follow up information about today's clinic visit or your schedule please contact [facility] directly at [phone].  Normal or non-critical lab and imaging results will be communicated to you by MyChart, letter or phone within 4 business days after the clinic has received the results. If you do not hear from us within 7 days, please contact the clinic through MyChart or phone. If you have a critical or abnormal lab result, we will notify you by phone as soon as possible.  Submit refill requests through [employer] or call your pharmacy and they will forward the refill request to us. Please allow 3 business days for your refill to be completed.          Additional Information About Your Visit        MyChart Information     [employer] is an electronic gateway that provides easy, online access to your medical records. With [employer], you can request a clinic appointment, read your test results, renew a prescription or communicate with your care team.     To sign up for [employer] visit the website at [email or web address]   You will be asked to enter the access code listed below, as well as some personal information. Please follow the directions to create your username and password.     Your access code is: [email or web address]  Expires: 2017  1:16 PM     Your access code will  in 90 days. If you need help or a  new code, please contact your [facility] or call [phone] for assistance.        Care EveryWhere ID     This is your Care EveryWhere ID. This could be used by other organizations to access your [facility] medical records  [id number]        Your Vitals Were     Pulse                   64            Blood Pressure from Last 3 Encounters:   11/02/17 132/81   10/10/17 123/75   09/14/17 115/72    Weight from Last 3 Encounters:   09/14/17 85.3 kg (188 lb 1.6 oz)   08/14/17 86.3 kg (190 lb 4.8 oz)   03/15/17 87.6 kg (193 lb 3.2 oz)              Today, you had the following     No orders found for display       Primary Care Provider Office Phone # Fax #    [facility] [phone] [address] [address]        Equal Access to Services     ROE BLACK : Hadii aad ku hadasho Soomaali, waaxda luqadaha, qaybta kaalmada adeegyada, chadd plummerin hayaan roberto spring . So [facility] [phone].    ATENCIÓN: Si habla español, tiene a young disposición servicios gratuitos de asistencia lingüística. Llame al [phone].    We comply with applicable federal civil rights laws and [place] laws. We do not discriminate on the basis of race, color, national origin, age, disability, sex, sexual orientation, or gender identity.            Thank you!     Thank you for choosing [facility]  for your care. Our goal is always to provide you with excellent care. Hearing back from our patients is one way we can continue to improve our services. Please take a few minutes to complete the written survey that you may receive in the mail after your visit with us. Thank you!             Your Updated Medication List - Protect others around you: Learn how to safely use, store and throw away your medicines at www.disposemymeds.org.          This list is accurate as of: 11/2/17  5:30 PM.  Always use your most recent med list.                   Brand  Name Dispense Instructions for use Diagnosis    acetaminophen 325 MG tablet    TYLENOL    100 tablet    Take 2 tablets (650 mg) by mouth every 4 hours as needed for other (mild pain)    Left breast mass       HYDROcodone-acetaminophen 5-325 MG per tablet    NORCO    15 tablet    Take 1-2 tablets by mouth every 6 hours as needed for other (Moderate to Severe Pain)    Left breast mass       ibuprofen 600 MG tablet    ADVIL/MOTRIN    30 tablet    Take 1 tablet (600 mg) by mouth every 6 hours as needed for pain (mild)    Left breast mass

## 2017-11-02 NOTE — PROGRESS NOTES
CHIEF COMPLAINT:  Chief Complaint   Patient presents with     RECHECK     follow up biopsy 10/10       HISTORY OF PRESENT ILLNESS:  Gisselle Kilpatrick is a 32 year old female who is seen for a post-op visit s/p left breast excisional biopsy on 10/10/17.  The final pathology showed multiple fibroadenomas, the largest of which was 2.9 cm.  There was no atypia or malignancy.  Gisselle states that she is healing well.    REVIEW OF SYSTEMS:  Constitutional:  Negative for chills, fatigue, fever and weight change.  Eyes:  Negative for blurred vision, eye pain and photophobia.  ENT:  Negative for hearing problems, ENT pain, congestion, rhinorrhea, epistaxis, hoarseness and dental problems.  Cardiovascular:  Negative for chest pain, palpitations, tachycardia, orthopnea and edema.  Respiratory:  Negative for cough, dyspnea and hemoptysis.  Gastrointestinal:  Negative for abdominal pain, heartburn, constipation, diarrhea and stool changes.  Musculoskeletal:  Negative for arthralgias, back pain and myalgias.  Integumentary/Breast:  See HPI.    No past medical history on file.    Past Surgical History:   Procedure Laterality Date     BIOPSY BREAST Left 10/10/2017    Procedure: BIOPSY BREAST;  Left breast excisional biopsy;  Surgeon: Tatum Huynh MD;  Location: [facility] OR     LUMPECTOMY BREAST Left 08/2016       Family History   Problem Relation Age of Onset     DIABETES Mother      Hypertension Mother      KIDNEY DISEASE Paternal Grandmother      dialysis     Lung Cancer Maternal Uncle        Social History   Substance Use Topics     Smoking status: Never Smoker     Smokeless tobacco: Never Used     Alcohol use No       There is no problem list on file for this patient.    No Known Allergies  Current Outpatient Prescriptions   Medication Sig Dispense Refill     acetaminophen (TYLENOL) 325 MG tablet Take 2 tablets (650 mg) by mouth every 4 hours as needed for other (mild pain) 100 tablet 0     ibuprofen (ADVIL/MOTRIN) 600 MG tablet  Take 1 tablet (600 mg) by mouth every 6 hours as needed for pain (mild) 30 tablet 0     HYDROcodone-acetaminophen (NORCO) 5-325 MG per tablet Take 1-2 tablets by mouth every 6 hours as needed for other (Moderate to Severe Pain) (Patient not taking: Reported on 11/2/2017) 15 tablet 0     Vitals: /81 (BP Location: Left arm)  Pulse 64  BMI= There is no height or weight on file to calculate BMI.    EXAM:  GENERAL: healthy, alert and no distress   BREAST:  The left breast periareolar incision is well healed.  There is no erythema or ecchymosis.  The breast tissue is soft and there is no evidence of hematoma or seroma.    ASSESSMENT:  Gisselle Kilpatrick is healing well s/p left breast excisional biopsy to remove multiple benign fibroadenomas.  She is concerned that this might happen again, as it did before.  I explained that I had removed every little fibroadenoma I could find in the central left breast, hopefully decreasing her risk of another large fibroadenoma.    PLAN:  Gisselle will continue to have a clinical breast exam at the time of her annual well woman exam.  She will follow up with me as needed.    Tatum Huynh MD    Please route or send letter to:  Jannie Issa NP

## 2017-11-02 NOTE — NURSING NOTE
"Gisselle Kilpatrick's goals for this visit include: biopsy follow up  She requests these members of her care team be copied on today's visit information: no    PCP: Fadi Lin    Referring Provider:  No referring provider defined for this encounter.    Chief Complaint   Patient presents with     RECHECK     follow up biopsy 10/10       Initial There were no vitals taken for this visit. Estimated body mass index is 33.06 kg/(m^2) as calculated from the following:    Height as of 9/14/17: 1.607 m (5' 3.25\").    Weight as of 9/14/17: 85.3 kg (188 lb 1.6 oz).  Medication Reconciliation: complete    Do you need any medication refills at today's visit? No    Trudy De Leon LPN      "

## 2017-11-02 NOTE — LETTER
2017    Re: Gisselle Kilpatrick - 1985    HISTORY OF PRESENT ILLNESS:  Gisselle Kilpatrick is a 32 year old female who is seen for a post-op visit s/p left breast excisional biopsy on 10/10/17.  The final pathology showed multiple fibroadenomas, the largest of which was 2.9 cm.  There was no atypia or malignancy.  Gisselle states that she is healing well.     REVIEW OF SYSTEMS:  Constitutional:  Negative for chills, fatigue, fever and weight change.  Eyes:  Negative for blurred vision, eye pain and photophobia.  ENT:  Negative for hearing problems, ENT pain, congestion, rhinorrhea, epistaxis, hoarseness and dental problems.  Cardiovascular:  Negative for chest pain, palpitations, tachycardia, orthopnea and edema.  Respiratory:  Negative for cough, dyspnea and hemoptysis.  Gastrointestinal:  Negative for abdominal pain, heartburn, constipation, diarrhea and stool changes.  Musculoskeletal:  Negative for arthralgias, back pain and myalgias.  Integumentary/Breast:  See HPI.    Vitals: /81 (BP Location: Left arm)  Pulse 64  BMI= There is no height or weight on file to calculate BMI.     EXAM:  GENERAL: healthy, alert and no distress   BREAST:  The left breast periareolar incision is well healed.  There is no erythema or ecchymosis. The breast tissue is soft and there is no evidence of hematoma or seroma.     ASSESSMENT:  Gisselle Kilpatrick is healing well s/p left breast excisional biopsy to remove multiple benign fibroadenomas.  She is concerned that this might happen again, as it did before.  I explained that I had removed every little fibroadenoma I could find in the central left breast, hopefully decreasing her risk of another large fibroadenoma.     PLAN:  Gisselle will continue to have a clinical breast exam at the time of her annual well woman exam.  She will follow up with me as needed.     Tautm Huynh MD

## 2017-11-21 ENCOUNTER — OFFICE VISIT (OUTPATIENT)
Dept: FAMILY MEDICINE | Facility: CLINIC | Age: 32
End: 2017-11-21
Payer: COMMERCIAL

## 2017-11-21 VITALS
WEIGHT: 188 LBS | DIASTOLIC BLOOD PRESSURE: 78 MMHG | TEMPERATURE: 98.4 F | SYSTOLIC BLOOD PRESSURE: 118 MMHG | OXYGEN SATURATION: 97 % | RESPIRATION RATE: 18 BRPM | HEIGHT: 63 IN | HEART RATE: 72 BPM | BODY MASS INDEX: 33.31 KG/M2

## 2017-11-21 DIAGNOSIS — I51.7 CARDIOMEGALY: Primary | ICD-10-CM

## 2017-11-21 PROCEDURE — 99213 OFFICE O/P EST LOW 20 MIN: CPT | Mod: 24 | Performed by: PHYSICIAN ASSISTANT

## 2017-11-21 ASSESSMENT — PAIN SCALES - GENERAL: PAINLEVEL: NO PAIN (0)

## 2017-11-21 NOTE — NURSING NOTE
"Chief Complaint   Patient presents with     Hospital F/U       Initial /78 (BP Location: Right arm, Patient Position: Chair, Cuff Size: Adult Large)  Pulse 72  Temp 98.4  F (36.9  C) (Oral)  Resp 18  Ht 1.607 m (5' 3.25\")  Wt 85.3 kg (188 lb)  LMP 11/07/2017 (Exact Date)  SpO2 97%  Breastfeeding? No  BMI 33.04 kg/m2 Estimated body mass index is 33.04 kg/(m^2) as calculated from the following:    Height as of this encounter: 1.607 m (5' 3.25\").    Weight as of this encounter: 85.3 kg (188 lb).  Medication Reconciliation: complete     Lora Pierce MA         "

## 2017-11-21 NOTE — PROGRESS NOTES
"  SUBJECTIVE:   Gisselle Kilpatrick is a 32 year old female who presents to clinic today for the following health issues:      ED/UC Followup:    Facility:    Date of visit: 10/2/17  Reason for visit: chest pain  Current Status: resolved         Seen in emergency department at  with chest pain which has completely resolved.  Had swelling of right arm with normal ultrasound but ddimer elevated so   Had CT chest which showed cardiac enlargement and recommended to follow up with primary care for echocardiogram.      Works as CNA.    Has not been exercising last 2 months until we get this figured out- was going to gym almost every day after work    Problem list and histories reviewed & adjusted, as indicated.  Additional history: as documented    There is no problem list on file for this patient.    Past Surgical History:   Procedure Laterality Date     BIOPSY BREAST Left 10/10/2017    Procedure: BIOPSY BREAST;  Left breast excisional biopsy;  Surgeon: Tatum Huynh MD;  Location: [facility] OR     LUMPECTOMY BREAST Left 08/2016       Social History   Substance Use Topics     Smoking status: Never Smoker     Smokeless tobacco: Never Used     Alcohol use No     Family History   Problem Relation Age of Onset     DIABETES Mother      Hypertension Mother      KIDNEY DISEASE Paternal Grandmother      dialysis     Lung Cancer Maternal Uncle          No current outpatient prescriptions on file.         Reviewed and updated as needed this visit by clinical staff       Reviewed and updated as needed this visit by Provider         ROS:  Constitutional, HEENT, cardiovascular, pulmonary, gi and gu systems are negative, except as otherwise noted.      OBJECTIVE:   /78 (BP Location: Right arm, Patient Position: Chair, Cuff Size: Adult Large)  Pulse 72  Temp 98.4  F (36.9  C) (Oral)  Resp 18  Ht 1.607 m (5' 3.25\")  Wt 85.3 kg (188 lb)  LMP 11/07/2017 (Exact Date)  SpO2 97%  Breastfeeding? " "No  BMI 33.04 kg/m2  Body mass index is 33.04 kg/(m^2).  GENERAL: healthy, alert and no distress  NECK: no adenopathy, no asymmetry, masses, or scars and thyroid normal to palpation  RESP: lungs clear to auscultation - no rales, rhonchi or wheezes  CV: regular rate and rhythm, normal S1 S2, no S3 or S4, no murmur, click or rub, no peripheral edema and peripheral pulses strong  ABDOMEN: soft, nontender, no hepatosplenomegaly, no masses and bowel sounds normal  MS: no gross musculoskeletal defects noted, no edema    Diagnostic Test Results:  none     ASSESSMENT/PLAN:         BMI:   Estimated body mass index is 33.04 kg/(m^2) as calculated from the following:    Height as of this encounter: 1.607 m (5' 3.25\").    Weight as of this encounter: 85.3 kg (188 lb).   Weight management plan: Discussed healthy diet and exercise guidelines and patient will follow up in 6 months in clinic to re-evaluate.        1. Cardiomegaly  Will obtain exercise stress echo  - Exercise Stress Echocardiogram; Future    Patient Instructions     Schedule Stress [facility] at [facility] ([phone]).       At [facility], we strive to deliver an exceptional experience to you, every time we see you.  If you receive a survey in the mail, please send us back your thoughts. We really do value your feedback.    Based on your medical history, these are the current health maintenance/preventive care services that you are due for (some may have been done at this visit.)  Health Maintenance Due   Topic Date Due     TETANUS IMMUNIZATION (SYSTEM ASSIGNED)  04/11/2003     INFLUENZA VACCINE (SYSTEM ASSIGNED)  09/01/2017         Suggested websites for health information:  Www.[employer].org : Up to date and easily searchable information on multiple topics.  Www.medlineplus.gov : medication info, interactive tutorials, watch real surgeries online  Www.familydoctor.org : good info from the Academy of Family " Physicians  Www.cdc.gov : public health info, travel advisories, epidemics (H1N1)  Www.aap.org : children's health info, normal development, vaccinations  Www.health.[facility].[place].us : [place] dept of health, public health issues in [place], N1N1    Your care team:     Family Medicine   CHERRI Bernal MD Emily Bunt, APRN CNP   S. MD Mildred Martell MD Angela Wermerskirchen, MD         Clinic hours: Monday - Wednesday 7 am-7 pm   Thursdays and Fridays 7 am-5 pm.     [facility]: Monday - Friday 11 am-9 pm,   Saturday and Sunday 9 am-5 pm.    [facility]: Monday -Thursday 8 am-8 pm; Friday 8 am-6 pm; Saturday and Sunday 9 am-5 pm.     [facility]: Monday - Thursday 8 am - 7 pm; Friday 8 am - 6 pm    Clinic: [phone]   [facility]: [phone]   [facility]: [phone]         Sade Crockett PA-C  [facility]

## 2017-11-21 NOTE — PATIENT INSTRUCTIONS
Schedule Stress [facility] at [facility] ([phone]).       At [facility], we strive to deliver an exceptional experience to you, every time we see you.  If you receive a survey in the mail, please send us back your thoughts. We really do value your feedback.    Based on your medical history, these are the current health maintenance/preventive care services that you are due for (some may have been done at this visit.)  Health Maintenance Due   Topic Date Due     TETANUS IMMUNIZATION (SYSTEM ASSIGNED)  04/11/2003     INFLUENZA VACCINE (SYSTEM ASSIGNED)  09/01/2017         Suggested websites for health information:  Www.[employer]obstats.org : Up to date and easily searchable information on multiple topics.  Www.medlineplus.gov : medication info, interactive tutorials, watch real surgeries online  Www.familydoctor.org : good info from the Academy of Family Physicians  Www.cdc.gov : public health info, travel advisories, epidemics (H1N1)  Www.aap.org : children's health info, normal development, vaccinations  Www.health.[facility].[place].us : [place] dept of health, public health issues in [place], N1N1    Your care team:     Family Medicine   CHERRI Bernal MD Emily Bunt, APRN MAXIMILIAN   S. MD Mildred Martell MD Angela Wermerskirchen, MD         Clinic hours: Monday - Wednesday 7 am-7 pm   Thursdays and Fridays 7 am-5 pm.     [facility]: Monday - Friday 11 am-9 pm,   Saturday and Sunday 9 am-5 pm.    [facility]: Monday -Thursday 8 am-8 pm; Friday 8 am-6 pm; Saturday and Sunday 9 am-5 pm.     [facility]: Monday Thursday 8 am   7 pm; Friday 8 am   6 pm    Clinic: [phone]   [facility]: [phone]   [facility]: [phone]

## 2017-11-21 NOTE — MR AVS SNAPSHOT
After Visit Summary   11/21/2017    Gisselle Kilpatrick    MRN: [id number]           Patient Information     Date Of Birth          1985        Visit Information        Provider Department      11/21/2017 10:40 AM Sade Crockett PA-C [facility]        Today's Diagnoses     Cardiomegaly    -  1      Care Instructions    Schedule Stress [facility]ho at [facility] ([phone]).       At [facility], we strive to deliver an exceptional experience to you, every time we see you.  If you receive a survey in the mail, please send us back your thoughts. We really do value your feedback.    Based on your medical history, these are the current health maintenance/preventive care services that you are due for (some may have been done at this visit.)  Health Maintenance Due   Topic Date Due     TETANUS IMMUNIZATION (SYSTEM ASSIGNED)  04/11/2003     INFLUENZA VACCINE (SYSTEM ASSIGNED)  09/01/2017         Suggested websites for health information:  Www.[employer] : Up to date and easily searchable information on multiple topics.  Www.medlineplus.gov : medication info, interactive tutorials, watch real surgeries online  Www.familydoctor.org : good info from the Academy of Family Physicians  Www.cdc.gov : public health info, travel advisories, epidemics (H1N1)  Www.aap.org : children's health info, normal development, vaccinations  Www.health.state.[place].us : [place] dept of health, public health issues in [place], N1N1    Your care team:     Family Medicine   CHERRI Bernal MD Emily Bunt, APRN [facility]   S. MD Mildred Martell MD Angela Wermerskirchen, MD         Clinic hours: Monday - Wednesday 7 am-7 pm   Thursdays and Fridays 7 am-5 pm.     [facility]: Monday - Friday 11 am-9 pm,   Saturday and Sunday 9 am-5 pm.    [facility]: Monday -Thursday 8 am-8 pm; Friday 8 am-6 pm; Saturday  "and  9 am-5 pm.     [facility]: Monday - Thursday 8 am - 7 pm; Friday 8 am - 6 pm    Clinic: [phone]   [facility]: [phone]   [facility]: [phone]            Follow-ups after your visit        Future tests that were ordered for you today     Open Future Orders        Priority Expected Expires Ordered    Exercise Stress Echocardiogram Routine  2018            Who to contact     If you have questions or need follow up information about today's clinic visit or your schedule please contact [facility] directly at [phone].  Normal or non-critical lab and imaging results will be communicated to you by MyChart, letter or phone within 4 business days after the clinic has received the results. If you do not hear from us within 7 days, please contact the clinic through [employer] or phone. If you have a critical or abnormal lab result, we will notify you by phone as soon as possible.  Submit refill requests through Lookwider or call your pharmacy and they will forward the refill request to us. Please allow 3 business days for your refill to be completed.          Additional Information About Your Visit        [employer] Information     Lookwider lets you send messages to your doctor, view your test results, renew your prescriptions, schedule appointments and more. To sign up, go to [email or web address] . Click on \"Log in\" on the left side of the screen, which will take you to the Welcome page. Then click on \"Sign up Now\" on the right side of the page.     You will be asked to enter the access code listed below, as well as some personal information. Please follow the directions to create your username and password.     Your access code is: [email or web address]  Expires: 2018 11:04 AM     Your access code will  in 90 days. If you need help or a new code, please call your [facility] or [phone].        Care " "EveryWhere ID     This is your Care EveryWhere ID. This could be used by other organizations to access your [place] medical records  [id number]        Your Vitals Were     Pulse Temperature Respirations Height Last Period Pulse Oximetry    72 98.4  F (36.9  C) (Oral) 18 1.607 m (5' 3.25\") 11/07/2017 (Exact Date) 97%    Breastfeeding? BMI (Body Mass Index)                No 33.04 kg/m2           Blood Pressure from Last 3 Encounters:   11/21/17 118/78   11/02/17 132/81   10/10/17 123/75    Weight from Last 3 Encounters:   11/21/17 85.3 kg (188 lb)   09/14/17 85.3 kg (188 lb 1.6 oz)   08/14/17 86.3 kg (190 lb 4.8 oz)               Primary Care Provider Office Phone # Fax #    [facility] [phone] [address] [address]        Equal Access to Services     ROE BLACK : Hadii aad ku hadasho Soomaali, waaxda luqadaha, qaybta kaalmada adeegyada, waxay elianin hayelizabeth spring . So [facility] [phone].    ATENCIÓN: Si habla español, tiene a young disposición servicios gratuitos de asistencia lingüística. Llame al [phone].    We comply with applicable federal civil rights laws and [place] laws. We do not discriminate on the basis of race, color, national origin, age, disability, sex, sexual orientation, or gender identity.            Thank you!     Thank you for choosing [facility]  for your care. Our goal is always to provide you with excellent care. Hearing back from our patients is one way we can continue to improve our services. Please take a few minutes to complete the written survey that you may receive in the mail after your visit with us. Thank you!             Your Updated Medication List - Protect others around you: Learn how to safely use, store and throw away your medicines at www.disposemymeds.org.      Notice  As of 11/21/2017 11:04 AM    You have not been prescribed any medications.      "

## 2017-12-04 ENCOUNTER — TELEPHONE (OUTPATIENT)
Dept: CARDIOLOGY | Facility: CLINIC | Age: 32
End: 2017-12-04

## 2017-12-04 NOTE — TELEPHONE ENCOUNTER
Called patient prior to stress echo scheduled for Dec 5 to discuss necessary preparation for test and assess for questions/concerns.  Reminded patient to remain NPO except water for at least 3 hours prior to test, take all medications as usual, to avoid caffeine and nicotine for 12 hr (including chocolate, decaf, Excedrin) and to wear comfortable clothing and shoes. Also informed patient that procedure is on a recumbent bike rather than treadmill. All questions answered, patient verbalized understanding.

## 2017-12-05 ENCOUNTER — RADIANT APPOINTMENT (OUTPATIENT)
Dept: CARDIOLOGY | Facility: CLINIC | Age: 32
End: 2017-12-05
Attending: PHYSICIAN ASSISTANT
Payer: COMMERCIAL

## 2017-12-05 VITALS — DIASTOLIC BLOOD PRESSURE: 76 MMHG | HEART RATE: 68 BPM | SYSTOLIC BLOOD PRESSURE: 119 MMHG

## 2017-12-05 DIAGNOSIS — I51.7 CARDIOMEGALY: ICD-10-CM

## 2017-12-05 DIAGNOSIS — I51.7 CARDIOMEGALY: Primary | ICD-10-CM

## 2017-12-05 PROCEDURE — 93350 STRESS TTE ONLY: CPT | Mod: 26 | Performed by: INTERNAL MEDICINE

## 2017-12-05 PROCEDURE — 93017 CV STRESS TEST TRACING ONLY: CPT

## 2017-12-05 PROCEDURE — 93321 DOPPLER ECHO F-UP/LMTD STD: CPT | Mod: TC

## 2017-12-05 PROCEDURE — 93325 DOPPLER ECHO COLOR FLOW MAPG: CPT | Mod: 26 | Performed by: INTERNAL MEDICINE

## 2017-12-05 PROCEDURE — 93352 ADMIN ECG CONTRAST AGENT: CPT

## 2017-12-05 PROCEDURE — 93016 CV STRESS TEST SUPVJ ONLY: CPT

## 2017-12-05 PROCEDURE — 93018 CV STRESS TEST I&R ONLY: CPT | Performed by: INTERNAL MEDICINE

## 2017-12-05 PROCEDURE — 93325 DOPPLER ECHO COLOR FLOW MAPG: CPT | Mod: TC

## 2017-12-05 PROCEDURE — 93321 DOPPLER ECHO F-UP/LMTD STD: CPT | Mod: 26 | Performed by: INTERNAL MEDICINE

## 2017-12-05 PROCEDURE — 93350 STRESS TTE ONLY: CPT | Mod: TC

## 2017-12-05 RX ADMIN — Medication 10 ML: at 09:20

## 2017-12-05 NOTE — PROGRESS NOTES
Patient presents today for stress echo ordered by MD. Prior to patient visit, chart prep by CMA done including confirmation of order, medications reviewed for contraindications, reviewed previous EKG's for trends & concerns and reviewed patient's medical history.    IV started in R AC with a 22G Jeclo Catheter.     Echo technician completed resting portion of echo.    Stress portion of echo completed utilizing bike and pictures taken at peak.  Blood pressure taken every 2 minutes and documented in Muse system.    Difinity medication used 5cc, wasted 5cc Definity NDC # 48204-755-94 (1.5ml Definity mixed with 8.5ml Saline )  Atropine medication given  NONE Atropine NDC# 32368-101-11     Patient offered complaints of: Leg pain and fatigue    After completion of stress echo, recovery period with blood pressure monitoring occurs at 1, 3 and 5 minutes and documented in Muse.  IV removed and water provided to patient.    Patient education provided about cardiology interpretation and primary provider will be notified of results.    Dr. Wheeler provided supervision of the tests performed today.    Mercedes Lares, CCT, Cardiac CMA

## 2017-12-11 ENCOUNTER — TELEPHONE (OUTPATIENT)
Dept: FAMILY MEDICINE | Facility: CLINIC | Age: 32
End: 2017-12-11

## 2017-12-11 NOTE — TELEPHONE ENCOUNTER
Reason for Call:  Other call back    Detailed comments: Gisselle called looking for results of her stress test taken on 12/05.  Please call with results.  Thank you     Phone Number Patient can be reached at: Home number on file [phone] (home)    Best Time: Any    Can we leave a detailed message on this number? YES    Call taken on 12/11/2017 at 2:38 PM by Ced Aragon

## 2017-12-12 NOTE — TELEPHONE ENCOUNTER
Letter was sent. Please read her below    Dear Gisselle,       I have received the results of your stress echo.  Unfortunately the study was not ideal since you did not achieve the target heart rate.  I recommend that you schedule follow up with cardiology to discuss this.  You may schedule follow up with them at [facility] ([phone]).      You may not need any further testing or they may recommend additional testing. Please call or MyChart my office with any questions or concerns.      Sincerely,        Sade Crockett PA-C

## 2017-12-12 NOTE — TELEPHONE ENCOUNTER
Spoke with pt and she received letter in the mail.    She had no further questions    Chelsey Navarro MA

## 2018-10-28 ENCOUNTER — OFFICE VISIT (OUTPATIENT)
Dept: URGENT CARE | Facility: URGENT CARE | Age: 33
End: 2018-10-28

## 2018-10-28 VITALS
DIASTOLIC BLOOD PRESSURE: 77 MMHG | OXYGEN SATURATION: 99 % | TEMPERATURE: 98.3 F | HEART RATE: 66 BPM | WEIGHT: 196.13 LBS | BODY MASS INDEX: 34.47 KG/M2 | SYSTOLIC BLOOD PRESSURE: 118 MMHG

## 2018-10-28 DIAGNOSIS — H57.11 EYE PAIN, RIGHT: Primary | ICD-10-CM

## 2018-10-28 PROCEDURE — 99213 OFFICE O/P EST LOW 20 MIN: CPT | Performed by: NURSE PRACTITIONER

## 2018-10-28 RX ORDER — OFLOXACIN 3 MG/ML
1 SOLUTION/ DROPS OPHTHALMIC
COMMUNITY
Start: 2018-10-25 | End: 2018-11-01

## 2018-10-28 ASSESSMENT — ENCOUNTER SYMPTOMS
EYE PAIN: 1
VOMITING: 0
NAUSEA: 0
SHORTNESS OF BREATH: 0
EYE REDNESS: 1
SORE THROAT: 0
DIARRHEA: 0
RHINORRHEA: 0
HEADACHES: 0
EYE DISCHARGE: 1
FEVER: 0
COUGH: 0
CHILLS: 0

## 2018-10-28 NOTE — MR AVS SNAPSHOT
"              After Visit Summary   10/28/2018    Gisselle Kilpatrick    MRN: [id number]           Patient Information     Date Of Birth          1985        Visit Information        Provider Department      10/28/2018 1:05 PM Klaudia Pitt NP [facility]         Follow-ups after your visit        Who to contact     If you have questions or need follow up information about today's clinic visit or your schedule please contact [facility] directly at [phone].  Normal or non-critical lab and imaging results will be communicated to you by MyChart, letter or phone within 4 business days after the clinic has received the results. If you do not hear from us within 7 days, please contact the clinic through [employer] or phone. If you have a critical or abnormal lab result, we will notify you by phone as soon as possible.  Submit refill requests through [employer] or call your pharmacy and they will forward the refill request to us. Please allow 3 business days for your refill to be completed.          Additional Information About Your Visit        [employer] Information     [employer] lets you send messages to your doctor, view your test results, renew your prescriptions, schedule appointments and more. To sign up, go to [email or web address] . Click on \"Log in\" on the left side of the screen, which will take you to the Welcome page. Then click on \"Sign up Now\" on the right side of the page.     You will be asked to enter the access code listed below, as well as some personal information. Please follow the directions to create your username and password.     Your access code is: [email or web address]  Expires: 2019  1:34 PM     Your access code will  in 90 days. If you need help or a new code, please call your [facility] or [phone].        Care EveryWhere ID     This is your Care EveryWhere ID. This could be used by other organizations to access your [place] medical " records  [id number]        Your Vitals Were     Pulse Temperature Pulse Oximetry Breastfeeding? BMI (Body Mass Index)       66 98.3  F (36.8  C) (Oral) 99% No 34.47 kg/m2        Blood Pressure from Last 3 Encounters:   10/28/18 118/77   12/05/17 119/76   11/21/17 118/78    Weight from Last 3 Encounters:   10/28/18 196 lb 2 oz (89 kg)   11/21/17 188 lb (85.3 kg)   09/14/17 188 lb 1.6 oz (85.3 kg)              Today, you had the following     No orders found for display       Primary Care Provider Office Phone # Fax #    Sade Crockett PA-C [phone] [address]  [place] [address]        Equal Access to Services     [facility]E [facility] : Hadii francesca godoy hadasho Soomaali, waaxda luqadaha, qaybta kaalmada adeegyada, chadd spring . So [facility] [phone].    ATENCIÓN: Si habla español, tiene a young disposición servicios gratuitos de asistencia lingüística. Millie al [phone].    We comply with applicable federal civil rights laws and [place] laws. We do not discriminate on the basis of race, color, national origin, age, disability, sex, sexual orientation, or gender identity.            Thank you!     Thank you for choosing [facility]  for your care. Our goal is always to provide you with excellent care. Hearing back from our patients is one way we can continue to improve our services. Please take a few minutes to complete the written survey that you may receive in the mail after your visit with us. Thank you!             Your Updated Medication List - Protect others around you: Learn how to safely use, store and throw away your medicines at www.disposemymeds.org.          This list is accurate as of 10/28/18  1:34 PM.  Always use your most recent med list.                   Brand Name Dispense Instructions for use Diagnosis    ofloxacin 0.3 % ophthalmic solution    OCUFLOX     Apply 1 drop to eye

## 2018-10-28 NOTE — PROGRESS NOTES
SUBJECTIVE:   Gisselle Kilpatrick is a 33 year old female presenting with a chief complaint of   Chief Complaint   Patient presents with     Eye Pain Right Eye     Seen a couple of days ago at another Urgent Care, given Ofoxacin drops, but they are not working, still has right-sided eye pain, patient feels her vision has gotten worse in the right eye since her symptoms       She is an established patient of [facility].    Eye Problem    Onset of symptoms was 4 day(s) ago.   Location: right eye   Course of illness is worsening.    Severity moderate  Current and Associated symptoms: pain, redness, blurry vision  Treatment measures tried include abx ointment  Context:   10/25/2018 seen in ER and treated for  Conjunctivitis put on ofloxacin, symptoms are getting worse.   Review of Systems   Constitutional: Negative for chills and fever.   HENT: Negative for congestion, ear pain, rhinorrhea and sore throat.    Eyes: Positive for pain, discharge and redness.   Respiratory: Negative for cough and shortness of breath.    Gastrointestinal: Negative for diarrhea, nausea and vomiting.   Neurological: Negative for headaches.   All other systems reviewed and are negative.      No past medical history on file.  Family History   Problem Relation Age of Onset     Diabetes Mother      Hypertension Mother      KIDNEY DISEASE Paternal Grandmother      dialysis     Lung Cancer Maternal Uncle      Current Outpatient Prescriptions   Medication Sig Dispense Refill     ofloxacin (OCUFLOX) 0.3 % ophthalmic solution Apply 1 drop to eye       Social History   Substance Use Topics     Smoking status: Never Smoker     Smokeless tobacco: Never Used     Alcohol use No       OBJECTIVE  /77 (BP Location: Left arm, Patient Position: Chair, Cuff Size: Adult Regular)  Pulse 66  Temp 98.3  F (36.8  C) (Oral)  Wt 196 lb 2 oz (89 kg)  SpO2 99%  Breastfeeding? No  BMI 34.47 kg/m2    Physical Exam   Eyes: EOM are normal. Pupils are equal, round, and  reactive to light. Lids are everted and swept, no foreign bodies found. Right eye exhibits discharge (clear). Right conjunctiva is injected.   Cardiovascular: Normal rate, S1 normal, S2 normal and normal heart sounds.    Pulmonary/Chest: Effort normal and breath sounds normal.   Neurological: She is alert.   Psychiatric: She has a normal mood and affect.      Right Eye 20/70  Left Eye    20/25  Both Eyes: 20/25    Done with no glasses on, she didn't have them.        ASSESSMENT:      ICD-10-CM    1. Eye pain, right H57.11             Serious Comorbid Conditions:  Adult:  None    PLAN:  Strongly recommend patient to be seen by an eye doctor. I have discussed this with patient. She would like to wait until tomorrow instead of ER to see an eye doctor at [facility]. I advised her to make an appointment.      There are no Patient Instructions on file for this visit.

## 2018-10-28 NOTE — LETTER
[facility]  [address]  [place] [address]  Phone: [phone]    October 28, 2018        Gisselle Kilpatrick  [address]  [place] [address]          To whom it may concern:    RE: Gisselle Kilpatrick    Patient was seen and treated today at our clinic. Please allow her to rest home 10/29/2018.   Patient may return to work with no restrictions.    Please contact me for questions or concerns.      Sincerely,        Klaudia Pitt NP

## 2018-10-28 NOTE — NURSING NOTE
.Right Eye 20/70   Left Eye 20/25   Both Eyes 20/25    The patient normally wears glasses, but did not have them on today.  She indicated that she feels her vision has gotten worse in the affected eye since the onset of her symptoms.     Elizabeth Jo MA

## 2019-08-22 ENCOUNTER — TELEPHONE (OUTPATIENT)
Dept: FAMILY MEDICINE | Facility: CLINIC | Age: 34
End: 2019-08-22

## 2019-08-22 NOTE — TELEPHONE ENCOUNTER
Panel Management Review   One phone call and send letter if unable to reach them or M[employer]ue[employer] message and send letter if not read after 2 weeks (You will get a message to your inbasket)      BP Readings from Last 1 Encounters:   10/28/18 118/77        Health Maintenance Due   Topic Date Due     PREVENTIVE CARE VISIT  1985     HIV SCREENING  04/11/2000     PHQ-2  01/01/2019     PAP  11/01/2019        Fail List measure: BMI        Patient is due/failing the following:   BMI    Action needed:   Patient needs office visit for Preventative Care Visit.    Type of outreach:    Phone, left message for patient to call back.     Questions for provider review:    None                                                                                                                        Trish Porter

## (undated) DEVICE — ESU ELEC BLADE 2.75" COATED/INSULATED E1455

## (undated) DEVICE — SOL WATER IRRIG 1000ML BOTTLE 07139-09

## (undated) DEVICE — PACK MINOR SBA15MIFSE

## (undated) DEVICE — PREP CHLORAPREP 26ML TINTED ORANGE  260815

## (undated) DEVICE — GLOVE PROTEXIS W/NEU-THERA 5.5  2D73TE55

## (undated) DEVICE — DRSG STERI STRIP 1/2X4" R1547

## (undated) DEVICE — SU VICRYL 3-0 SH 27" J316H

## (undated) DEVICE — DRAPE BREAST/CHEST 29420

## (undated) DEVICE — SU VICRYL 4-0 PS-2 18" UND J496H

## (undated) DEVICE — MARKER MARGIN MARKER STD 6 COLOR SGL USE MMS6

## (undated) RX ORDER — CEFAZOLIN SODIUM 2 G/100ML
INJECTION, SOLUTION INTRAVENOUS
Status: DISPENSED
Start: 2017-10-10

## (undated) RX ORDER — OXYCODONE HYDROCHLORIDE 5 MG/1
TABLET ORAL
Status: DISPENSED
Start: 2017-10-10

## (undated) RX ORDER — CELECOXIB 200 MG/1
CAPSULE ORAL
Status: DISPENSED
Start: 2017-10-10

## (undated) RX ORDER — FENTANYL CITRATE 50 UG/ML
INJECTION, SOLUTION INTRAMUSCULAR; INTRAVENOUS
Status: DISPENSED
Start: 2017-10-10

## (undated) RX ORDER — GABAPENTIN 300 MG/1
CAPSULE ORAL
Status: DISPENSED
Start: 2017-10-10

## (undated) RX ORDER — ACETAMINOPHEN 325 MG/1
TABLET ORAL
Status: DISPENSED
Start: 2017-10-10

## (undated) RX ORDER — LIDOCAINE HYDROCHLORIDE 10 MG/ML
INJECTION, SOLUTION EPIDURAL; INFILTRATION; INTRACAUDAL; PERINEURAL
Status: DISPENSED
Start: 2017-10-10